# Patient Record
Sex: FEMALE | Race: WHITE | NOT HISPANIC OR LATINO | Employment: OTHER | URBAN - METROPOLITAN AREA
[De-identification: names, ages, dates, MRNs, and addresses within clinical notes are randomized per-mention and may not be internally consistent; named-entity substitution may affect disease eponyms.]

---

## 2023-02-22 LAB
APPEARANCE, URINE: ABNORMAL
BACTERIA, URINE: ABNORMAL /HPF
BILIRUBIN, URINE: NEGATIVE
BLOOD, URINE: NEGATIVE
COLOR, URINE: YELLOW
GLUCOSE, URINE: NEGATIVE MG/DL
KETONES, URINE: NEGATIVE MG/DL
LEUKOCYTE ESTERASE, URINE: ABNORMAL
NITRITE, URINE: NEGATIVE
PH, URINE: 8 (ref 5–8)
PROTEIN, URINE: NEGATIVE MG/DL
RBC, URINE: 2 /HPF (ref 0–5)
SPECIFIC GRAVITY, URINE: 1 (ref 1–1.03)
UROBILINOGEN, URINE: <2 MG/DL (ref 0–1.9)
WBC, URINE: 84 /HPF (ref 0–5)

## 2023-02-23 LAB — URINE CULTURE: NORMAL

## 2023-06-08 LAB — HELICOBACTER PYLORI AG: NORMAL

## 2023-06-12 LAB — HELICOBACTER PYLORI AG: NEGATIVE

## 2023-07-15 DIAGNOSIS — I10 ESSENTIAL (PRIMARY) HYPERTENSION: ICD-10-CM

## 2023-07-15 RX ORDER — LISINOPRIL 5 MG/1
TABLET ORAL
Qty: 90 TABLET | Refills: 0 | Status: SHIPPED | OUTPATIENT
Start: 2023-07-15 | End: 2023-10-15

## 2023-07-17 ENCOUNTER — OFFICE VISIT (OUTPATIENT)
Dept: PRIMARY CARE | Facility: CLINIC | Age: 73
End: 2023-07-17
Payer: MEDICARE

## 2023-07-17 VITALS
SYSTOLIC BLOOD PRESSURE: 106 MMHG | HEIGHT: 65 IN | OXYGEN SATURATION: 98 % | WEIGHT: 101.4 LBS | HEART RATE: 84 BPM | TEMPERATURE: 96.9 F | BODY MASS INDEX: 16.89 KG/M2 | DIASTOLIC BLOOD PRESSURE: 62 MMHG

## 2023-07-17 DIAGNOSIS — D80.3 IGG SUBCLASS DEFICIENCY (MULTI): ICD-10-CM

## 2023-07-17 DIAGNOSIS — Z12.31 ENCOUNTER FOR SCREENING MAMMOGRAM FOR BREAST CANCER: ICD-10-CM

## 2023-07-17 DIAGNOSIS — E46 PROTEIN-CALORIE MALNUTRITION, UNSPECIFIED SEVERITY (MULTI): ICD-10-CM

## 2023-07-17 DIAGNOSIS — Z00.00 ROUTINE GENERAL MEDICAL EXAMINATION AT HEALTH CARE FACILITY: Primary | ICD-10-CM

## 2023-07-17 DIAGNOSIS — Z13.6 SCREENING FOR CARDIOVASCULAR CONDITION: ICD-10-CM

## 2023-07-17 DIAGNOSIS — K21.9 GASTROESOPHAGEAL REFLUX DISEASE WITHOUT ESOPHAGITIS: ICD-10-CM

## 2023-07-17 PROBLEM — I10 BENIGN ESSENTIAL HYPERTENSION: Status: ACTIVE | Noted: 2023-07-17

## 2023-07-17 PROBLEM — J45.40 MODERATE PERSISTENT ASTHMA WITHOUT COMPLICATION (HHS-HCC): Status: ACTIVE | Noted: 2023-07-17

## 2023-07-17 PROBLEM — D75.1 POLYCYTHEMIA: Status: ACTIVE | Noted: 2023-07-17

## 2023-07-17 PROBLEM — G47.33 OSA (OBSTRUCTIVE SLEEP APNEA): Status: ACTIVE | Noted: 2023-07-17

## 2023-07-17 PROBLEM — G47.00 INSOMNIA: Status: ACTIVE | Noted: 2023-07-17

## 2023-07-17 PROCEDURE — 3074F SYST BP LT 130 MM HG: CPT | Performed by: FAMILY MEDICINE

## 2023-07-17 PROCEDURE — 1126F AMNT PAIN NOTED NONE PRSNT: CPT | Performed by: FAMILY MEDICINE

## 2023-07-17 PROCEDURE — 3008F BODY MASS INDEX DOCD: CPT | Performed by: FAMILY MEDICINE

## 2023-07-17 PROCEDURE — G0439 PPPS, SUBSEQ VISIT: HCPCS | Performed by: FAMILY MEDICINE

## 2023-07-17 PROCEDURE — 1160F RVW MEDS BY RX/DR IN RCRD: CPT | Performed by: FAMILY MEDICINE

## 2023-07-17 PROCEDURE — 3078F DIAST BP <80 MM HG: CPT | Performed by: FAMILY MEDICINE

## 2023-07-17 PROCEDURE — 1170F FXNL STATUS ASSESSED: CPT | Performed by: FAMILY MEDICINE

## 2023-07-17 PROCEDURE — 1036F TOBACCO NON-USER: CPT | Performed by: FAMILY MEDICINE

## 2023-07-17 PROCEDURE — 1159F MED LIST DOCD IN RCRD: CPT | Performed by: FAMILY MEDICINE

## 2023-07-17 RX ORDER — OMEPRAZOLE 20 MG/1
1 CAPSULE, DELAYED RELEASE ORAL DAILY
COMMUNITY
Start: 2023-07-12 | End: 2024-01-02 | Stop reason: WASHOUT

## 2023-07-17 RX ORDER — MV-MIN/FOLIC/VIT K/LYCOP/COQ10 200-100MCG
CAPSULE ORAL
COMMUNITY

## 2023-07-17 RX ORDER — FLUTICASONE PROPIONATE 50 MCG
SPRAY, SUSPENSION (ML) NASAL
COMMUNITY

## 2023-07-17 RX ORDER — FLUTICASONE FUROATE AND VILANTEROL TRIFENATATE 100; 25 UG/1; UG/1
POWDER RESPIRATORY (INHALATION)
COMMUNITY
Start: 2018-02-27 | End: 2023-08-26

## 2023-07-17 RX ORDER — ACETAMINOPHEN 500 MG
TABLET ORAL
COMMUNITY
End: 2024-01-02 | Stop reason: WASHOUT

## 2023-07-17 RX ORDER — FAMOTIDINE 40 MG/1
40 TABLET, FILM COATED ORAL NIGHTLY
Qty: 30 TABLET | Refills: 1 | Status: SHIPPED | OUTPATIENT
Start: 2023-07-17 | End: 2023-08-10

## 2023-07-17 ASSESSMENT — ACTIVITIES OF DAILY LIVING (ADL)
BATHING: INDEPENDENT
GROCERY_SHOPPING: INDEPENDENT
TAKING_MEDICATION: INDEPENDENT
MANAGING_FINANCES: INDEPENDENT
DOING_HOUSEWORK: INDEPENDENT
DRESSING: INDEPENDENT

## 2023-07-17 ASSESSMENT — PATIENT HEALTH QUESTIONNAIRE - PHQ9
2. FEELING DOWN, DEPRESSED OR HOPELESS: NOT AT ALL
SUM OF ALL RESPONSES TO PHQ9 QUESTIONS 1 AND 2: 0
1. LITTLE INTEREST OR PLEASURE IN DOING THINGS: NOT AT ALL

## 2023-07-17 ASSESSMENT — PAIN SCALES - GENERAL: PAINLEVEL: 0-NO PAIN

## 2023-07-17 NOTE — PATIENT INSTRUCTIONS
Immunizations recommended:  --Flu shot every fall.  --Pneumonia shots and covid vaccine have been done.  --Shingrix--has been done.  --Tetanus every 10 years.--yours was done in 2022.     Pap not needed.     Colonoscopy should be done every 10 years after the age of 45, unless there was a previous abnormality, or family history of colon cancer.  Yours was done in 2019.     Mammogram screening recommendations are changing, but at this time, I recommend a mammogram every 1-2 years in your 40's, and yearly after the age of 50. Having a family history of breast cancer may change that.  Yours done 7/21/21, so due soon.     You should try to get 150 minutes of exercise weekly.  Be sure to eat a diet rich in fruits and vegetables, and low in saturated fats and sodium.  Strive for a healthy BMI of less than 25.      Make sure to wear sun screen when outside, and check for changing or unusual moles.     Pre-menopause recommendations are for 1000 mg calcium and 1000 units vitamin D3 daily. After menopause calcium recommendation is 1200 mg, with 1000 units of vitamin D3  Bone density is due to evaluate for osteoporosis.     I recommend you get a Living Will and Durable Power of  for Healthcare. These documents state what care you would want if you couldn't speak for yourself. They help your loved ones in caring for you if that happens.   Once you have those forms completed, you can keep a copy on file with your doctor.     Specialists being seen:  Dr. Tracy, sleep medicine  Dr. Ladd, allergist.  yessenia Goodwin had seen.    Dermatologist as needed.     BP controlled on 5 mg lisinopril.  Check fasting lipids some morning.  Other blood work done in May.  Blood Pressure Treatment goals include:        BP of 120/80, with no higher than 140/85 on consistent basis.    Exercise at 150 minutes weekly.  Eat a balanced diet, rich in fruits and vegetables, low in sodium and processed foods.  If you are overweight, work toward  a goal BMI of less than 25.     For asthma, continue Breo.    For  acid reflux, taking omeprazole for 2 weeks.  (Or can alternate omeprazole with famotidine when 3 pills  left.  When done, switch to famotidine 40 mg at bedtime.  If doing well, will wean to lower dose and stop

## 2023-07-17 NOTE — PROGRESS NOTES
"Subjective   Reason for Visit: Ev Loja is an 73 y.o. female here for a Medicare Wellness visit.     Past Medical, Surgical, and Family History reviewed and updated in chart.    Reviewed all medications by prescribing practitioner or clinical pharmacist (such as prescriptions, OTCs, herbal therapies and supplements) and documented in the medical record.    Saw Dr. Tracy last week.  Having trouble sleeping  Thinks may be related to GERD.  Put on omeprazole for 2 weeks  Is doing  better.    Breathing doing well with Breo.        Patient Care Team:  Sasha Ferguson MD as PCP - General  Sasha Ferguson MD as PCP [facility] Attributed Provider     Review of Systems    Objective   Vitals:  /62 (BP Location: Right arm, Patient Position: Sitting, BP Cuff Size: Small child)   Pulse 84   Temp 36.1 °C (96.9 °F) (Temporal)   Ht 1.651 m (5' 5\")   Wt 46 kg (101 lb 6.4 oz)   SpO2 98%   BMI 16.87 kg/m²       Physical Exam  Vitals reviewed.   Constitutional:       Appearance: Normal appearance.   Cardiovascular:      Rate and Rhythm: Normal rate and regular rhythm.      Heart sounds: No murmur heard.  Pulmonary:      Effort: Pulmonary effort is normal.      Breath sounds: Normal breath sounds.   Musculoskeletal:      Right lower leg: No edema.      Left lower leg: No edema.   Neurological:      Mental Status: She is alert.         Assessment/Plan   Problem List Items Addressed This Visit       IgG subclass deficiency (CMS/HCC)    Protein-calorie malnutrition, unspecified severity (CMS/HCC)    GERD (gastroesophageal reflux disease)    Relevant Medications    famotidine (Pepcid) 40 mg tablet     Other Visit Diagnoses       Routine general medical examination at health care facility    -  Primary    Relevant Orders    1 Year Follow Up In [facility] - PCP - Wellness Exam    BMI less than 19,adult        Screening for cardiovascular condition        Relevant Orders    Lipid panel    CT cardiac scoring wo IV " contrast    Encounter for screening mammogram for breast cancer        Relevant Orders    BI mammo bilateral screening tomosynthesis

## 2023-08-10 DIAGNOSIS — K21.9 GASTROESOPHAGEAL REFLUX DISEASE WITHOUT ESOPHAGITIS: ICD-10-CM

## 2023-08-10 RX ORDER — FAMOTIDINE 40 MG/1
40 TABLET, FILM COATED ORAL NIGHTLY
Qty: 30 TABLET | Refills: 1 | Status: SHIPPED | OUTPATIENT
Start: 2023-08-10 | End: 2023-08-27

## 2023-08-26 DIAGNOSIS — J45.40 MODERATE PERSISTENT ASTHMA WITHOUT COMPLICATION (HHS-HCC): Primary | ICD-10-CM

## 2023-08-26 RX ORDER — FLUTICASONE FUROATE AND VILANTEROL TRIFENATATE 100; 25 UG/1; UG/1
POWDER RESPIRATORY (INHALATION)
Qty: 180 EACH | Refills: 1 | Status: SHIPPED | OUTPATIENT
Start: 2023-08-26 | End: 2024-02-20

## 2023-09-07 PROBLEM — J32.2 CHRONIC POSTERIOR ETHMOIDAL SINUSITIS: Status: ACTIVE | Noted: 2023-09-07

## 2023-09-07 PROBLEM — M25.511 RIGHT SHOULDER PAIN: Status: ACTIVE | Noted: 2023-09-07

## 2023-09-07 PROBLEM — D22.4 MELANOCYTIC NEVI OF SCALP AND NECK: Status: ACTIVE | Noted: 2022-11-18

## 2023-09-07 PROBLEM — Z85.828 PERSONAL HISTORY OF OTHER MALIGNANT NEOPLASM OF SKIN: Status: ACTIVE | Noted: 2022-11-18

## 2023-09-07 PROBLEM — C44.319 BASAL CELL CARCINOMA OF SKIN OF OTHER PARTS OF FACE: Status: ACTIVE | Noted: 2022-11-18

## 2023-09-07 PROBLEM — L82.1 OTHER SEBORRHEIC KERATOSIS: Status: ACTIVE | Noted: 2022-11-18

## 2023-09-07 PROBLEM — L30.9 DERMATITIS, UNSPECIFIED: Status: ACTIVE | Noted: 2022-11-18

## 2023-09-07 PROBLEM — R35.0 INCREASED URINARY FREQUENCY: Status: ACTIVE | Noted: 2023-09-07

## 2023-09-07 PROBLEM — D48.5 NEOPLASM OF UNCERTAIN BEHAVIOR OF SKIN: Status: ACTIVE | Noted: 2022-11-18

## 2023-09-07 PROBLEM — D18.01 HEMANGIOMA OF SKIN AND SUBCUTANEOUS TISSUE: Status: ACTIVE | Noted: 2022-11-18

## 2023-09-07 PROBLEM — R39.9 UTI SYMPTOMS: Status: ACTIVE | Noted: 2023-09-07

## 2023-09-07 PROBLEM — L85.3 XEROSIS CUTIS: Status: ACTIVE | Noted: 2022-11-18

## 2023-09-07 PROBLEM — R73.09 ELEVATED GLUCOSE: Status: ACTIVE | Noted: 2023-09-07

## 2023-09-07 PROBLEM — N39.0 URINARY TRACT INFECTION, ACUTE: Status: ACTIVE | Noted: 2023-09-07

## 2023-09-07 RX ORDER — POLYETHYLENE GLYCOL 3350 17 G/17G
255 POWDER, FOR SOLUTION ORAL
COMMUNITY
Start: 2016-11-22 | End: 2024-01-02 | Stop reason: WASHOUT

## 2023-09-07 RX ORDER — ONDANSETRON 4 MG/1
4 TABLET, ORALLY DISINTEGRATING ORAL EVERY 4 HOURS PRN
COMMUNITY
Start: 2016-11-21 | End: 2024-01-02 | Stop reason: WASHOUT

## 2023-09-07 RX ORDER — TRIAMCINOLONE ACETONIDE 0.25 MG/G
CREAM TOPICAL
COMMUNITY
Start: 2022-11-18 | End: 2024-01-02 | Stop reason: WASHOUT

## 2023-09-07 RX ORDER — AMOXICILLIN 500 MG/1
CAPSULE ORAL
COMMUNITY
Start: 2023-02-24 | End: 2024-01-02 | Stop reason: WASHOUT

## 2023-10-06 ENCOUNTER — LAB (OUTPATIENT)
Dept: LAB | Facility: LAB | Age: 73
End: 2023-10-06
Payer: MEDICARE

## 2023-10-06 DIAGNOSIS — Z13.6 SCREENING FOR CARDIOVASCULAR CONDITION: ICD-10-CM

## 2023-10-06 LAB
CHOLEST SERPL-MCNC: 153 MG/DL (ref 0–199)
CHOLESTEROL/HDL RATIO: 2
HDLC SERPL-MCNC: 76.1 MG/DL
LDLC SERPL CALC-MCNC: 62 MG/DL (ref 140–190)
NON HDL CHOLESTEROL: 77 MG/DL (ref 0–149)
TRIGL SERPL-MCNC: 73 MG/DL (ref 0–149)
VLDL: 15 MG/DL (ref 0–40)

## 2023-10-06 PROCEDURE — 36415 COLL VENOUS BLD VENIPUNCTURE: CPT

## 2023-10-06 PROCEDURE — 80061 LIPID PANEL: CPT

## 2023-10-11 ENCOUNTER — APPOINTMENT (OUTPATIENT)
Dept: SLEEP MEDICINE | Facility: CLINIC | Age: 73
End: 2023-10-11
Payer: MEDICARE

## 2023-10-14 DIAGNOSIS — I10 ESSENTIAL (PRIMARY) HYPERTENSION: ICD-10-CM

## 2023-10-15 RX ORDER — LISINOPRIL 5 MG/1
TABLET ORAL
Qty: 90 TABLET | Refills: 0 | Status: SHIPPED | OUTPATIENT
Start: 2023-10-15 | End: 2024-01-01

## 2023-10-21 ENCOUNTER — OFFICE VISIT (OUTPATIENT)
Dept: DERMATOLOGY | Facility: CLINIC | Age: 73
End: 2023-10-21
Payer: MEDICARE

## 2023-10-21 DIAGNOSIS — D48.5 NEOPLASM OF UNCERTAIN BEHAVIOR OF SKIN: Primary | ICD-10-CM

## 2023-10-21 PROCEDURE — 1036F TOBACCO NON-USER: CPT | Performed by: NURSE PRACTITIONER

## 2023-10-21 PROCEDURE — 88305 TISSUE EXAM BY PATHOLOGIST: CPT | Mod: TC,DER | Performed by: NURSE PRACTITIONER

## 2023-10-21 PROCEDURE — 99212 OFFICE O/P EST SF 10 MIN: CPT | Performed by: NURSE PRACTITIONER

## 2023-10-21 PROCEDURE — 88305 TISSUE EXAM BY PATHOLOGIST: CPT | Performed by: DERMATOLOGY

## 2023-10-21 PROCEDURE — 1126F AMNT PAIN NOTED NONE PRSNT: CPT | Performed by: NURSE PRACTITIONER

## 2023-10-21 PROCEDURE — 1159F MED LIST DOCD IN RCRD: CPT | Performed by: NURSE PRACTITIONER

## 2023-10-21 PROCEDURE — 1160F RVW MEDS BY RX/DR IN RCRD: CPT | Performed by: NURSE PRACTITIONER

## 2023-10-21 PROCEDURE — 11102 TANGNTL BX SKIN SINGLE LES: CPT | Performed by: NURSE PRACTITIONER

## 2023-10-21 PROCEDURE — 3008F BODY MASS INDEX DOCD: CPT | Performed by: NURSE PRACTITIONER

## 2023-10-23 NOTE — PROGRESS NOTES
Subjective     Ev Loja is a 73 y.o. female who presents for the following: Skin Check (Presents for examination of single lesion to lower leg, present for an unknown amount of time and previously painful (now asymptomatic). PMHX significant for BCC. No further complaints.).     Review of Systems:  No other skin or systemic complaints other than what is documented elsewhere in the note.    The following portions of the chart were reviewed this encounter and updated as appropriate:          Skin Cancer History  No skin cancer on file.      Specialty Problems          Dermatology Problems    Basal cell carcinoma of skin of other parts of face    Dermatitis, unspecified    Hemangioma of skin and subcutaneous tissue    Melanocytic nevi of scalp and neck    Neoplasm of uncertain behavior of skin    Other seborrheic keratosis    Personal history of other malignant neoplasm of skin    Xerosis cutis        Objective   Well appearing patient in no apparent distress; mood and affect are within normal limits.    A focused skin examination was performed. All findings within normal limits unless otherwise noted below.    Assessment/Plan   1. Neoplasm of uncertain behavior of skin  Right Lower Leg - Anterior  1.0 cm round scaly papule              Lesion biopsy  Type of biopsy: tangential    Informed consent: discussed and consent obtained    Timeout: patient name, date of birth, surgical site, and procedure verified    Procedure prep:  Patient was prepped and draped  Anesthesia: the lesion was anesthetized in a standard fashion    Anesthetic:  1% lidocaine w/ epinephrine 1-100,000 local infiltration  Instrument used: DermaBlade    Hemostasis achieved with: aluminum chloride    Outcome: patient tolerated procedure well    Post-procedure details: sterile dressing applied and wound care instructions given    Dressing type: petrolatum and bandage      Specimen 1 - Dermatopathology- DERM LAB  Differential Diagnosis: nmsc  Check  Margins Yes/No?:    Comments:    Dermpath Lab: Routine Histopathology (formalin-fixed tissue)    - Discussed differential with patient in clinic today.   - Given uncertainty in clinical diagnosis, biopsy is recommended in clinic today.  - The patient expressed understanding, is in agreement with this plan, and wishes to proceed with biopsy.  - Oral and written wound care instructions provided.  - Advised patient that the office will call within 2 weeks to discuss biopsy results.

## 2023-10-25 ENCOUNTER — APPOINTMENT (OUTPATIENT)
Dept: RADIOLOGY | Facility: CLINIC | Age: 73
End: 2023-10-25
Payer: MEDICARE

## 2023-10-25 LAB
LABORATORY COMMENT REPORT: NORMAL
PATH REPORT.FINAL DX SPEC: NORMAL
PATH REPORT.GROSS SPEC: NORMAL
PATH REPORT.RELEVANT HX SPEC: NORMAL
PATH REPORT.TOTAL CANCER: NORMAL

## 2023-12-30 ASSESSMENT — DERMATOLOGY QUALITY OF LIFE (QOL) ASSESSMENT
RATE HOW BOTHERED YOU ARE BY EFFECTS OF YOUR SKIN PROBLEMS ON YOUR ACTIVITIES (EG, GOING OUT, ACCOMPLISHING WHAT YOU WANT, WORK ACTIVITIES OR YOUR RELATIONSHIPS WITH OTHERS): 0 - NEVER BOTHERED
RATE HOW BOTHERED YOU ARE BY EFFECTS OF YOUR SKIN PROBLEMS ON YOUR ACTIVITIES (EG, GOING OUT, ACCOMPLISHING WHAT YOU WANT, WORK ACTIVITIES OR YOUR RELATIONSHIPS WITH OTHERS): 0 - NEVER BOTHERED
WHAT SINGLE SKIN CONDITION LISTED BELOW IS THE PATIENT ANSWERING THE QUALITY-OF-LIFE ASSESSMENT QUESTIONS ABOUT: NONE OF THE ABOVE
RATE HOW EMOTIONALLY BOTHERED YOU ARE BY YOUR SKIN PROBLEM (FOR EXAMPLE, WORRY, EMBARRASSMENT, FRUSTRATION): 0 - NEVER BOTHERED
RATE HOW BOTHERED YOU ARE BY SYMPTOMS OF YOUR SKIN PROBLEM (EG, ITCHING, STINGING BURNING, HURTING OR SKIN IRRITATION): 6 - ALWAYS BOTHERED
WHAT SINGLE SKIN CONDITION LISTED BELOW IS THE PATIENT ANSWERING THE QUALITY-OF-LIFE ASSESSMENT QUESTIONS ABOUT: NONE OF THE ABOVE
RATE HOW BOTHERED YOU ARE BY SYMPTOMS OF YOUR SKIN PROBLEM (EG, ITCHING, STINGING BURNING, HURTING OR SKIN IRRITATION): 6 - ALWAYS BOTHERED
RATE HOW EMOTIONALLY BOTHERED YOU ARE BY YOUR SKIN PROBLEM (FOR EXAMPLE, WORRY, EMBARRASSMENT, FRUSTRATION): 0 - NEVER BOTHERED

## 2023-12-30 ASSESSMENT — PATIENT GLOBAL ASSESSMENT (PGA): WHAT IS THE PGA: PATIENT GLOBAL ASSESSMENT:  3 - MODERATE

## 2024-01-02 ENCOUNTER — OFFICE VISIT (OUTPATIENT)
Dept: DERMATOLOGY | Facility: CLINIC | Age: 74
End: 2024-01-02
Payer: MEDICARE

## 2024-01-02 DIAGNOSIS — L82.1 SEBORRHEIC KERATOSIS: ICD-10-CM

## 2024-01-02 DIAGNOSIS — D22.9 MULTIPLE BENIGN NEVI: Primary | ICD-10-CM

## 2024-01-02 DIAGNOSIS — Z85.828 PERSONAL HISTORY OF SKIN CANCER: ICD-10-CM

## 2024-01-02 DIAGNOSIS — L90.5 SCAR CONDITIONS AND FIBROSIS OF SKIN: ICD-10-CM

## 2024-01-02 DIAGNOSIS — L81.4 LENTIGO: ICD-10-CM

## 2024-01-02 DIAGNOSIS — B35.3 TINEA PEDIS OF BOTH FEET: ICD-10-CM

## 2024-01-02 DIAGNOSIS — R20.2 NOTALGIA PARESTHETICA: ICD-10-CM

## 2024-01-02 PROBLEM — L85.8 KERATOACANTHOMA: Status: ACTIVE | Noted: 2024-01-02

## 2024-01-02 PROBLEM — D18.01 HEMANGIOMA OF SKIN AND SUBCUTANEOUS TISSUE: Status: RESOLVED | Noted: 2022-11-18 | Resolved: 2024-01-02

## 2024-01-02 PROBLEM — L85.3 XEROSIS CUTIS: Status: RESOLVED | Noted: 2022-11-18 | Resolved: 2024-01-02

## 2024-01-02 PROBLEM — D22.4 MELANOCYTIC NEVI OF SCALP AND NECK: Status: RESOLVED | Noted: 2022-11-18 | Resolved: 2024-01-02

## 2024-01-02 PROBLEM — L57.0 ACTINIC KERATOSIS: Status: ACTIVE | Noted: 2024-01-02

## 2024-01-02 PROBLEM — D48.5 NEOPLASM OF UNCERTAIN BEHAVIOR OF SKIN: Status: RESOLVED | Noted: 2022-11-18 | Resolved: 2024-01-02

## 2024-01-02 PROCEDURE — 1159F MED LIST DOCD IN RCRD: CPT | Performed by: DERMATOLOGY

## 2024-01-02 PROCEDURE — 1126F AMNT PAIN NOTED NONE PRSNT: CPT | Performed by: DERMATOLOGY

## 2024-01-02 PROCEDURE — 99213 OFFICE O/P EST LOW 20 MIN: CPT | Performed by: DERMATOLOGY

## 2024-01-02 PROCEDURE — 1036F TOBACCO NON-USER: CPT | Performed by: DERMATOLOGY

## 2024-01-02 PROCEDURE — 1160F RVW MEDS BY RX/DR IN RCRD: CPT | Performed by: DERMATOLOGY

## 2024-01-02 PROCEDURE — 3008F BODY MASS INDEX DOCD: CPT | Performed by: DERMATOLOGY

## 2024-01-02 ASSESSMENT — DERMATOLOGY PATIENT ASSESSMENT
DO YOU HAVE ANY NEW OR CHANGING LESIONS: NO
HAVE YOU HAD OR DO YOU HAVE A STAPH INFECTION: NO
ARE YOU ON BIRTH CONTROL: NO
DO YOU USE SUNSCREEN: OCCASIONALLY
DO YOU USE A TANNING BED: NO
DO YOU HAVE IRREGULAR MENSTRUAL CYCLES: NO
HAVE YOU HAD OR DO YOU HAVE VASCULAR DISEASE: NO
ARE YOU AN ORGAN TRANSPLANT RECIPIENT: NO
ARE YOU TRYING TO GET PREGNANT: NO

## 2024-01-02 ASSESSMENT — DERMATOLOGY QUALITY OF LIFE (QOL) ASSESSMENT
RATE HOW BOTHERED YOU ARE BY SYMPTOMS OF YOUR SKIN PROBLEM (EG, ITCHING, STINGING BURNING, HURTING OR SKIN IRRITATION): 0 - NEVER BOTHERED
ARE THERE EXCLUSIONS OR EXCEPTIONS FOR THE QUALITY OF LIFE ASSESSMENT: NO
RATE HOW BOTHERED YOU ARE BY EFFECTS OF YOUR SKIN PROBLEMS ON YOUR ACTIVITIES (EG, GOING OUT, ACCOMPLISHING WHAT YOU WANT, WORK ACTIVITIES OR YOUR RELATIONSHIPS WITH OTHERS): 0 - NEVER BOTHERED
RATE HOW EMOTIONALLY BOTHERED YOU ARE BY YOUR SKIN PROBLEM (FOR EXAMPLE, WORRY, EMBARRASSMENT, FRUSTRATION): 0 - NEVER BOTHERED
DATE THE QUALITY-OF-LIFE ASSESSMENT WAS COMPLETED: 66841

## 2024-01-02 ASSESSMENT — PATIENT GLOBAL ASSESSMENT (PGA): PATIENT GLOBAL ASSESSMENT: PATIENT GLOBAL ASSESSMENT:  1 - CLEAR

## 2024-01-02 ASSESSMENT — ITCH NUMERIC RATING SCALE: HOW SEVERE IS YOUR ITCHING?: 0

## 2024-01-02 NOTE — PROGRESS NOTES
Subjective     Ev Loja is a 73 y.o. female who presents for the following: Skin Check.       She was last seen in October by Sue Mayne, a skin biopsy was performed on the right shin that was suggestive of a resolving keratoacanthoma. She was recommended Mohs surgery       Review of Systems:  No other skin or systemic complaints other than what is documented elsewhere in the note.    The following portions of the chart were reviewed this encounter and updated as appropriate:  Tobacco  Allergies  Meds  Problems  Med Hx  Surg Hx         Skin Cancer History  No skin cancer on file.      Specialty Problems          Dermatology Problems    Basal cell carcinoma of skin of other parts of face     history of BCC on right temporal region diagnosed by Dr. Maria Del Rosario Heath on 5/31/19 s/p Mohssurgery by Dr. Villa on 8/8/19          Dermatitis, unspecified    Actinic keratosis    Keratoacanthoma     - biopsied 10/2023, suggestive of a resolving keratoacanthoma. She was recommended Mohs surgery but declines in favor of monitoring for recurrence given her age and location on leg with higher risk of poor wound healing  - 1/2024, scar on exam, no evidence of recurrence             Objective   Well appearing patient in no apparent distress; mood and affect are within normal limits.    A full examination was performed including scalp, head, eyes, ears, nose, lips, neck, chest, axillae, abdomen, back, buttocks, bilateral upper extremities, bilateral lower extremities, hands, feet, fingers, toes, fingernails, and toenails. All findings within normal limits unless otherwise noted below.    Assessment/Plan   1. Multiple benign nevi  Brown and tan macules and papules with reassuring findings on dermoscopy    -These lesions have benign, reassuring patterns on dermoscopy  -Recommend continued self observation, and to contact the office if any changes in nevi are noticed    2. Lentigo  Tan macules    -Benign appearing on  exam  -Reassurance, recommend observation    3. Seborrheic keratosis  Stuck on, waxy macule(s)/papule(s)/plaque(s) with comedo-like openings and milia like cysts    -Discussed the nature of the diagnosis  -Reassurance, recommend continued observation    4. Personal history of skin cancer    Personal History of Non-Melanoma Skin Cancer  -Well healed scar(s) with no evidence of recurrence  -Discussed the need for annual or semi-annual skin examinations and to return sooner if any new or changing lesions are noticed. Patient verbalizes understanding    Related Procedures  Follow Up In Dermatology - Established Patient    5. Scar conditions and fibrosis of skin  Right Lower Leg - Anterior  Atrophic white patch at site of biopsy site    She was last seen in October by Sue Mayne, a skin biopsy was performed on the right shin that was suggestive of a resolving keratoacanthoma. She was recommended Mohs surgery but has not scheduled it. It has healed well    We discussed the nature of keratoacanthoma - that it can resolve on it's own, but it can also recur locally. Based on her age, location on the leg and risk for poor wound-healing, she prefers to monitor clinically. She understands to call the office ASAP if the skin in this area gets rough, raised, or crusted again or if another similar lesion appears        6. Tinea pedis of both feet (2)  Left Foot - Anterior, Left Foot - Posterior  Erythema and scaling in a moccasin-distribution with interdigital web space maceration    - plans to use OTC products    7. Notalgia paresthetica  Left Upper Back  Brown patch    Long-standing problem  Has tried topicals with no improvement  Neck stretches in the morning seem to help        Full Skin Exam 6 months

## 2024-01-10 ENCOUNTER — OFFICE VISIT (OUTPATIENT)
Dept: SLEEP MEDICINE | Facility: CLINIC | Age: 74
End: 2024-01-10
Payer: MEDICARE

## 2024-01-10 VITALS
HEIGHT: 65 IN | OXYGEN SATURATION: 97 % | DIASTOLIC BLOOD PRESSURE: 75 MMHG | WEIGHT: 102 LBS | HEART RATE: 80 BPM | SYSTOLIC BLOOD PRESSURE: 118 MMHG | BODY MASS INDEX: 17 KG/M2

## 2024-01-10 DIAGNOSIS — K21.9 GASTROESOPHAGEAL REFLUX DISEASE WITHOUT ESOPHAGITIS: ICD-10-CM

## 2024-01-10 DIAGNOSIS — G47.00 INSOMNIA, UNSPECIFIED TYPE: ICD-10-CM

## 2024-01-10 DIAGNOSIS — G47.33 OSA (OBSTRUCTIVE SLEEP APNEA): Primary | ICD-10-CM

## 2024-01-10 PROCEDURE — 1036F TOBACCO NON-USER: CPT | Performed by: STUDENT IN AN ORGANIZED HEALTH CARE EDUCATION/TRAINING PROGRAM

## 2024-01-10 PROCEDURE — 1159F MED LIST DOCD IN RCRD: CPT | Performed by: STUDENT IN AN ORGANIZED HEALTH CARE EDUCATION/TRAINING PROGRAM

## 2024-01-10 PROCEDURE — 3008F BODY MASS INDEX DOCD: CPT | Performed by: STUDENT IN AN ORGANIZED HEALTH CARE EDUCATION/TRAINING PROGRAM

## 2024-01-10 PROCEDURE — 99214 OFFICE O/P EST MOD 30 MIN: CPT | Performed by: STUDENT IN AN ORGANIZED HEALTH CARE EDUCATION/TRAINING PROGRAM

## 2024-01-10 PROCEDURE — 1160F RVW MEDS BY RX/DR IN RCRD: CPT | Performed by: STUDENT IN AN ORGANIZED HEALTH CARE EDUCATION/TRAINING PROGRAM

## 2024-01-10 PROCEDURE — 1126F AMNT PAIN NOTED NONE PRSNT: CPT | Performed by: STUDENT IN AN ORGANIZED HEALTH CARE EDUCATION/TRAINING PROGRAM

## 2024-01-10 PROCEDURE — 3074F SYST BP LT 130 MM HG: CPT | Performed by: STUDENT IN AN ORGANIZED HEALTH CARE EDUCATION/TRAINING PROGRAM

## 2024-01-10 PROCEDURE — 3078F DIAST BP <80 MM HG: CPT | Performed by: STUDENT IN AN ORGANIZED HEALTH CARE EDUCATION/TRAINING PROGRAM

## 2024-01-10 ASSESSMENT — SLEEP AND FATIGUE QUESTIONNAIRES
HOW LIKELY ARE YOU TO NOD OFF OR FALL ASLEEP WHILE LYING DOWN TO REST IN THE AFTERNOON WHEN CIRCUMSTANCES PERMIT: WOULD NEVER DOZE
HOW LIKELY ARE YOU TO NOD OFF OR FALL ASLEEP WHILE SITTING AND READING: WOULD NEVER DOZE
SATISFACTION_WITH_CURRENT_SLEEP_PATTERN: SATISFIED
WORRIED_DISTRESSED_DUE_TO_SLEEP: SOMEWHAT
SITING INACTIVE IN A PUBLIC PLACE LIKE A CLASS ROOM OR A MOVIE THEATER: WOULD NEVER DOZE
HOW LIKELY ARE YOU TO NOD OFF OR FALL ASLEEP WHILE SITTING QUIETLY AFTER LUNCH WITHOUT ALCOHOL: WOULD NEVER DOZE
HOW LIKELY ARE YOU TO NOD OFF OR FALL ASLEEP IN A CAR, WHILE STOPPED FOR A FEW MINUTES IN TRAFFIC: WOULD NEVER DOZE
SLEEP_PROBLEM_NOTICEABLE_TO_OTHERS: MUCH
HOW LIKELY ARE YOU TO NOD OFF OR FALL ASLEEP WHILE WATCHING TV: WOULD NEVER DOZE
SLEEP_PROBLEM_INTERFERES_DAILY_ACTIVITIES: A LITTLE
HOW LIKELY ARE YOU TO NOD OFF OR FALL ASLEEP WHEN YOU ARE A PASSENGER IN A CAR FOR AN HOUR WITHOUT A BREAK: WOULD NEVER DOZE
HOW LIKELY ARE YOU TO NOD OFF OR FALL ASLEEP WHILE SITTING AND TALKING TO SOMEONE: WOULD NEVER DOZE
ESS-CHAD TOTAL SCORE: 0

## 2024-01-10 ASSESSMENT — LIFESTYLE VARIABLES
HOW OFTEN DO YOU HAVE A DRINK CONTAINING ALCOHOL: NEVER
SKIP TO QUESTIONS 9-10: 1
HOW MANY STANDARD DRINKS CONTAINING ALCOHOL DO YOU HAVE ON A TYPICAL DAY: PATIENT DOES NOT DRINK
HOW OFTEN DO YOU HAVE SIX OR MORE DRINKS ON ONE OCCASION: NEVER
AUDIT-C TOTAL SCORE: 0

## 2024-01-10 ASSESSMENT — PATIENT HEALTH QUESTIONNAIRE - PHQ9
SUM OF ALL RESPONSES TO PHQ9 QUESTIONS 1 AND 2: 0
1. LITTLE INTEREST OR PLEASURE IN DOING THINGS: NOT AT ALL
2. FEELING DOWN, DEPRESSED OR HOPELESS: NOT AT ALL

## 2024-01-10 ASSESSMENT — ENCOUNTER SYMPTOMS
CARDIOVASCULAR NEGATIVE: 1
CONSTITUTIONAL NEGATIVE: 1
NEUROLOGICAL NEGATIVE: 1
RESPIRATORY NEGATIVE: 1
PSYCHIATRIC NEGATIVE: 1

## 2024-01-10 NOTE — PROGRESS NOTES
Patient: Ev Loja    [id number]  : 1950 -- AGE 73 y.o.    Provider: Jean Tracy MD     Location [facility]   Service Date: 1/10/2024              [facility]  Followup Visit Note    HISTORY OF PRESENT ILLNESS     HISTORY OF PRESENT ILLNESS   Ev Loja is a 73 y.o. female with h/o Hypertension, RENE, and Insomnia who presents to a [facility] for followup.     Assessment and plan from last visit:     # RENE  - she was first diagnosed in 2018. start on CPAP 10 -> 12 cwp.  - it was done with Dr. Lopes - [facility] lab  - doing very well with her CPAP therapy   - compliance is 100% with good control of symptoms  - will adjust pressure setting to 10 cwp and change back to nasal mask  - renew supply order     # HTN  - /74 today in clinic  - well controlled, continue with healthy diet & exercise  - continue to f/u with PCP      # Fatigue  - feeling better about this     # Sleep Maintenance Insomnia/GERD  - still finds herself waking up in the middle of the night and sometimes it's related to her digestive issues  - she was treated for candidiasis but still has some reflux issues  - she was also switched to 12 cwp during her last titration and was forced to use a FFM instead of nasal. She feels like sometimes she also feels more bloated.  - will adjust CPAP setting  - start omeprazole 20 mg for 2 weeks to see if she feels any better, if she does, we may consider switching to a H2 Blocker for maintenance therapy for her GERD.    Current History    RENE:   -not having issues with CPAP    GERD/Sleep maintenance Insomnia:   -will sleep 2-3 days well and then have a night where she feels like she cannot stay asleep. She sits in bed and just tries to fall back asleep.   -is now taking psyllium and Pepcid, feels the GERD sx have resolved but didn't fix insomnia   -on a normal day sleeps 7-8  -thinks could be related to ruminating on  "thoughts at night   -does not feel like she needs a nap        ESS: 0  BOO: 7  FOSQ: 37    REVIEW OF SYSTEMS     REVIEW OF SYSTEMS  Review of Systems   Constitutional: Negative.    HENT: Negative.     Respiratory: Negative.     Cardiovascular: Negative.    Genitourinary: Negative.    Skin: Negative.    Neurological: Negative.    Psychiatric/Behavioral: Negative.           ALLERGIES AND MEDICATIONS     ALLERGIES  No Known Allergies    MEDICATIONS: She has a current medication list which includes the following prescription(s): bone essentials - Take by mouth, famotidine - TAKE 1 TABLET (40 MG) BY MOUTH ONCE DAILY AT BEDTIME, fluticasone - Administer into affected nostril(s), breo ellipta - 1 PUFF INTO THE LUNGS ONCE DAILY, and lisinopril - TAKE 1 TABLET BY MOUTH EVERY DAY.    PAST MEDICAL HISTORY : She  has a past medical history of Basal cell carcinoma, Personal history of other diseases of the circulatory system (02/27/2018), and Personal history of other diseases of the respiratory system (02/27/2018).    PAST SURGICAL HISTORY: She  has a past surgical history that includes Sinus surgery (02/27/2018) and Other surgical history (07/13/2021).     FAMILY HISTORY: No changes since previous visit. Otherwise non-contributory as charted.     SOCIAL HISTORY  She  reports that she has never smoked. She has never been exposed to tobacco smoke. She has never used smokeless tobacco. She reports that she does not currently use alcohol. She reports that she does not use drugs.       PHYSICAL EXAM     VITAL SIGNS: /75   Pulse 80   Ht 1.638 m (5' 4.5\")   Wt 46.3 kg (102 lb)   SpO2 97%   BMI 17.24 kg/m²      PREVIOUS WEIGHTS:  Wt Readings from Last 3 Encounters:   01/10/24 46.3 kg (102 lb)   07/17/23 46 kg (101 lb 6.4 oz)   07/12/23 45.1 kg (99 lb 6 oz)         RESULTS/DATA     Bicarbonate (mmol/L)   Date Value   07/26/2022 25   07/13/2021 28       PAP Adherence  A PAP adherence download was obtained and data was " reviewed personally today in clinic.    ASSESSMENT/PLAN     Ms. Loja is a 73 y.o. female and she returns in followup to the [facility] for RENE and Insomnia.    Problem List, Orders, Assessment, Recommendations:    #RENE  -initially diagnosed in 2018 and has been very well controlled   -continues to have perfect adherence with CPAP, AHI .2  -continue with CPAP 10cwp   -renew supply orders    #HTN  -BP today 118/75  -well controlled on lisinopril   -will continue to follow with PCP    #Sleep maintenance insomnia/GERD  -felt in the past that she was waking up in the middle of the night due to digestive issues   -now on famotidine and psyllium which is controlling GERD sx but insomnia sx persist   -today we discussed 1) getting out of bed when she cannot sleep/doing something else and 2) paradoxical intention    Disposition    Return to clinic in 12 months    Caroline Lozano MS4         I was present with the Medical Student who participated in the documentation of this note. I have personally seen and re-examined the patient and performed the medical decision-making components (assessment and plan of care). I have reviewed the Medical Student documentation and verified the findings in the note as written with additions or exceptions as stated in the body of this note.

## 2024-02-01 ENCOUNTER — HOSPITAL ENCOUNTER (OUTPATIENT)
Dept: RADIOLOGY | Facility: HOSPITAL | Age: 74
Discharge: HOME | End: 2024-02-01
Payer: MEDICARE

## 2024-02-01 DIAGNOSIS — Z13.6 ENCOUNTER FOR SCREENING FOR CARDIOVASCULAR DISORDERS: ICD-10-CM

## 2024-02-01 PROCEDURE — 75571 CT HRT W/O DYE W/CA TEST: CPT

## 2024-02-05 DIAGNOSIS — H91.90 HEARING LOSS, UNSPECIFIED HEARING LOSS TYPE, UNSPECIFIED LATERALITY: Primary | ICD-10-CM

## 2024-04-04 ENCOUNTER — HOSPITAL ENCOUNTER (OUTPATIENT)
Dept: RADIOLOGY | Facility: HOSPITAL | Age: 74
Discharge: HOME | End: 2024-04-04
Payer: MEDICARE

## 2024-04-04 ENCOUNTER — OFFICE VISIT (OUTPATIENT)
Dept: ORTHOPEDIC SURGERY | Facility: HOSPITAL | Age: 74
End: 2024-04-04
Payer: MEDICARE

## 2024-04-04 DIAGNOSIS — S62.102A LEFT WRIST FRACTURE, CLOSED, INITIAL ENCOUNTER: ICD-10-CM
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
## 2024-04-04 DIAGNOSIS — M25.532 WRIST PAIN, ACUTE, LEFT: ICD-10-CM
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
## 2024-04-04 PROCEDURE — 99203 OFFICE O/P NEW LOW 30 MIN: CPT | Performed by: EMERGENCY MEDICINE

## 2024-04-04 PROCEDURE — 1160F RVW MEDS BY RX/DR IN RCRD: CPT | Performed by: EMERGENCY MEDICINE

## 2024-04-04 PROCEDURE — 73110 X-RAY EXAM OF WRIST: CPT | Mod: LEFT SIDE | Performed by: RADIOLOGY

## 2024-04-04 PROCEDURE — 1159F MED LIST DOCD IN RCRD: CPT | Performed by: EMERGENCY MEDICINE

## 2024-04-04 PROCEDURE — 99213 OFFICE O/P EST LOW 20 MIN: CPT | Performed by: EMERGENCY MEDICINE

## 2024-04-04 PROCEDURE — 73110 X-RAY EXAM OF WRIST: CPT | Mod: LT

## 2024-04-04 NOTE — PROGRESS NOTES
"Sports Medicine Office Note    Today's Date: 4/4/2024     HPI: Ev Loja is a 74 y.o. RHD who presents today for left wrist pain.    Yesterday she was walking in her 's leg was outstretched while he was working on something on the floor when she tripped and fell.  She did not fall on an outstretched hand as she knows this can increase injury so she her arm across her chest.  She still developed significant swelling and pain of her left wrist.  Of note she has had a history of a radius fracture over 20 years ago with plating and screw fixation.  Her wrist had not bothered her since then.  She denies any numbness and tingling in the hands.  She took 1 Tylenol as the pain is minimal when she does not use it.  She is using an Ace wrap but no brace.    Physical Examination:     Left Wrist Exam:    Visual inspection:   Swelling and deformity over the distal forearm and wrist  Range of motion: Limited flexion, extension, and supination of the wrist  Palpation: Tenderness palpation over the distal radius, distal interosseous membrane.  No snuffbox tenderness.  Strength: 4/5 strength of the wrist in all planes due to pain. Decreased  strength.  (-) Piano fermin test  (-) DRUJ Shuck test  (-) TFCC grind test  Can perform \"OK\" sign    Imaging:  Radiographs of the right wrist were reviewed and personally interpreted, which revealed post-surgical hardware with questionable dorsal distal radius fracture.  The studies were reviewed with Dr. Andrew personally in the office today.    Assessment and Plan:     1. Fall at home, initial encounter        2. Wrist pain, acute, left  XR wrist left 3+ views    CT wrist LEFT wo IV contrast        She had a fall and landed on the dorsum of her left wrist.  On exam she has significant swelling and deformity over her dorsal wrist with limited range of motion and strength.  X-ray shows questionable dorsal distal radius fracture, but difficult to tell due to hardware being in place.  " At this time recommend stat CT scan of the wrist to rule out acute fracture and determine surgical intervention.  She is placed in a volar plaster splint in the meantime for immobilization as she is still significantly swollen.  Further management pending CT scan.  If it does not show an acute fracture, we will communicate with her to remove the splint and treat conservatively.  We will follow-up after CT scan.    Mina Ramos DO  Sports Medicine Fellow  [facility] [place]

## 2024-04-04 NOTE — PROGRESS NOTES
Assisted patient with removal of 2 rings from 4th finger.  Patient's spouse present and placed in his pocket.    Applied a well-padded short arm volar splint to patient's LUE per doctor's order.    Dx: questionable L distal radius fracture s/p ORIF from 20 years ago  Patient tolerated the splint application well.  No increased pain reported.  Sensation and blood flow intact.    Educated patient and spouse on splint care and elevation.    Will follow-up after CT scan.    Emily Cruz AT

## 2024-04-05 ENCOUNTER — HOSPITAL ENCOUNTER (OUTPATIENT)
Dept: RADIOLOGY | Facility: CLINIC | Age: 74
End: 2024-04-05
Payer: MEDICARE

## 2024-04-05 ENCOUNTER — HOSPITAL ENCOUNTER (OUTPATIENT)
Dept: RADIOLOGY | Facility: HOSPITAL | Age: 74
Discharge: HOME | End: 2024-04-05
Payer: MEDICARE

## 2024-04-05 DIAGNOSIS — M25.532 WRIST PAIN, ACUTE, LEFT: ICD-10-CM

## 2024-04-05 DIAGNOSIS — S62.102A LEFT WRIST FRACTURE, CLOSED, INITIAL ENCOUNTER: ICD-10-CM

## 2024-04-05 PROCEDURE — 73200 CT UPPER EXTREMITY W/O DYE: CPT | Mod: LEFT SIDE | Performed by: RADIOLOGY

## 2024-04-05 PROCEDURE — 73200 CT UPPER EXTREMITY W/O DYE: CPT | Mod: LT

## 2024-04-11 ENCOUNTER — OFFICE VISIT (OUTPATIENT)
Dept: ORTHOPEDIC SURGERY | Facility: HOSPITAL | Age: 74
End: 2024-04-11
Payer: MEDICARE

## 2024-04-11 DIAGNOSIS — S52.502S: Primary | ICD-10-CM

## 2024-04-11 DIAGNOSIS — S52.602S: Primary | ICD-10-CM

## 2024-04-11 PROCEDURE — 25600 CLTX DST RDL FX/EPHYS SEP WO: CPT | Performed by: EMERGENCY MEDICINE

## 2024-04-11 PROCEDURE — 99213 OFFICE O/P EST LOW 20 MIN: CPT | Performed by: EMERGENCY MEDICINE

## 2024-04-11 PROCEDURE — 29085 APPL CAST HAND&LWR FOREARM: CPT | Performed by: EMERGENCY MEDICINE

## 2024-04-11 PROCEDURE — 1159F MED LIST DOCD IN RCRD: CPT | Performed by: EMERGENCY MEDICINE

## 2024-04-11 PROCEDURE — 1160F RVW MEDS BY RX/DR IN RCRD: CPT | Performed by: EMERGENCY MEDICINE

## 2024-04-11 NOTE — PROGRESS NOTES
"Sports Medicine Office Note    Today's Date: 4/11/2024     HPI: Ev Loja is a 74 y.o. RHD who presents today for left wrist pain.    4/11/24: Patient returns today for reevaluation and CT results of her wrist.  Her pain and swelling have improved significantly since her last visit.  She has tolerated the splint without difficulty.  No additional complaints.    4/4/24: Yesterday she was walking in her 's leg was outstretched while he was working on something on the floor when she tripped and fell.  She did not fall on an outstretched hand as she knows this can increase injury so she her arm across her chest.  She still developed significant swelling and pain of her left wrist.  Of note she has had a history of a radius fracture over 20 years ago with plating and screw fixation.  Her wrist had not bothered her since then.  She denies any numbness and tingling in the hands.  She took 1 Tylenol as the pain is minimal when she does not use it.  She is using an Ace wrap but no brace.    Physical Examination:     Left Wrist Exam:    Visual inspection:   Swelling and deformity over the distal forearm and wrist have improved.  Range of motion: Limited flexion, extension, and supination of the wrist  Palpation: Tenderness palpation over the distal radius, distal interosseous membrane.  No snuffbox tenderness.  Strength: 4/5 strength of the wrist in all planes due to pain. Decreased  strength.  (-) Piano fermin test  (-) DRUJ Shuck test  (-) TFCC grind test  Can perform \"OK\" sign    Imaging:   I have personally reviewed and agree with Radiologist interpretation of previous imaging studies performed prior to this visit.   CT of the  left wrist without intravenous contrast dated  4/5/2024.      INDICATION:  Signs/Symptoms:r/o fracture distal radius around hardware      COMPARISON:  None.      ACCESSION NUMBER(S):  [id number]      ORDERING CLINICIAN:  GERI LANDERS      TECHNIQUE:  Axial CT of the   left wrist was " performed  without intravenous  contrast.  Sagittal and coronal 2 dimensional reformats were obtained.      FINDINGS:  OSSEOUS STRUCTURES AND JOINTS:      The bones are demineralized. There is comminuted intra-articular  fracturing the distal dorsal radius with involvement of Kali's  tubercle. There is mild buckling at the fracturing. There is  extension of the fracturing adjacent to the tips the screws in the  transverse portion the fixation plate as well as adjacent to the tip  the distal most screw in the vertical portion of the T part of the  plate. There is prior fracture deformity of the distal radius with  osseous bridging at the prior fracture deformity. There is prior  fracture of the ulnar styloid with corticated fragmentation. Mild  polyarticular degenerative changes seen of the wrist. No dislocation  is evident.  There is a small volume distal radioulnar joint effusion.      MUSCLES AND TENDONS:      Muscles and tendons are grossly unremarkable as seen on CT.      ASSOCIATED SOFT TISSUES:      Soft tissue swelling is seen about the wrist.      IMPRESSION:  1. Comminuted intra-articular fracturing of the dorsal distal radius  with extension adjacent to the tips of multiple screws for the  fixation plate for prior fracturing.  2. Small volume distal radioulnar joint effusion.  3. Prior fracturing of the distal radius and ulna.      MACRO:  none      Signed by: éCsar Vogel 4/5/2024 2:57 PM  Dictation workstation:   [id number]    Assessment and Plan:     1. Fracture of radius and ulna near wrist, left, sequela            I reviewed imaging and examination findings with patient.  CT does confirm.  He hardware and intra-articular fracture without significant placement.  No significant hardware displacement.  Considering this, I do feel that she can be managed nonoperatively.  Will place her in a short arm fiberglass cast today.  I would like to see her back in 4 weeks for reevaluation.      Festus Andrew  DO  [facility], [facility] Medicine [place]    ** Please excuse any errors in grammar or translation related to this dictation. Voice recognition software was utilized to prepare this document. **

## 2024-04-12 NOTE — PROGRESS NOTES
Applied a well-padded short arm cast to patient's LUE, per DrKristyn's orders.  Dx: L wrist fracture  Patient tolerated cast application without complication.  Neurovascularly intact.    Educated patient on cast care and elevation.    She will follow-up in 4 weeks as directed.    Emily Cruz AT

## 2024-04-17 ENCOUNTER — APPOINTMENT (OUTPATIENT)
Dept: OTOLARYNGOLOGY | Facility: CLINIC | Age: 74
End: 2024-04-17
Payer: MEDICARE

## 2024-04-17 ENCOUNTER — APPOINTMENT (OUTPATIENT)
Dept: AUDIOLOGY | Facility: CLINIC | Age: 74
End: 2024-04-17
Payer: MEDICARE

## 2024-05-06 ENCOUNTER — HOSPITAL ENCOUNTER (OUTPATIENT)
Dept: RADIOLOGY | Facility: HOSPITAL | Age: 74
Discharge: HOME | End: 2024-05-06
Payer: MEDICARE

## 2024-05-06 ENCOUNTER — OFFICE VISIT (OUTPATIENT)
Dept: ORTHOPEDIC SURGERY | Facility: HOSPITAL | Age: 74
End: 2024-05-06
Payer: MEDICARE

## 2024-05-06 VITALS — BODY MASS INDEX: 17 KG/M2 | HEIGHT: 65 IN | WEIGHT: 102 LBS

## 2024-05-06 DIAGNOSIS — S52.602S: Primary | ICD-10-CM

## 2024-05-06 DIAGNOSIS — S52.502S: ICD-10-CM

## 2024-05-06 DIAGNOSIS — S52.502S: Primary | ICD-10-CM

## 2024-05-06 DIAGNOSIS — S52.602S: ICD-10-CM

## 2024-05-06 PROCEDURE — 99213 OFFICE O/P EST LOW 20 MIN: CPT | Performed by: EMERGENCY MEDICINE

## 2024-05-06 PROCEDURE — 1159F MED LIST DOCD IN RCRD: CPT | Performed by: EMERGENCY MEDICINE

## 2024-05-06 PROCEDURE — 1160F RVW MEDS BY RX/DR IN RCRD: CPT | Performed by: EMERGENCY MEDICINE

## 2024-05-06 PROCEDURE — 1036F TOBACCO NON-USER: CPT | Performed by: EMERGENCY MEDICINE

## 2024-05-06 PROCEDURE — 73110 X-RAY EXAM OF WRIST: CPT | Mod: LEFT SIDE | Performed by: RADIOLOGY

## 2024-05-06 PROCEDURE — 1125F AMNT PAIN NOTED PAIN PRSNT: CPT | Performed by: EMERGENCY MEDICINE

## 2024-05-06 PROCEDURE — 99024 POSTOP FOLLOW-UP VISIT: CPT | Performed by: EMERGENCY MEDICINE

## 2024-05-06 PROCEDURE — 73110 X-RAY EXAM OF WRIST: CPT | Mod: LT

## 2024-05-06 PROCEDURE — L3984 UPPER EXT FX ORTHOSIS WRIST: HCPCS | Performed by: EMERGENCY MEDICINE

## 2024-05-06 ASSESSMENT — PAIN SCALES - GENERAL: PAINLEVEL_OUTOF10: 2

## 2024-05-06 ASSESSMENT — PAIN - FUNCTIONAL ASSESSMENT: PAIN_FUNCTIONAL_ASSESSMENT: 0-10

## 2024-05-06 NOTE — PROGRESS NOTES
"Sports Medicine Office Note    Today's Date: 5/6/2024     HPI: Ev Loja is a 74 y.o. RHD who presents today for left wrist pain.    5/6/24: Patient returns today for reevaluation for left distal radius fracture.  She has been compliant with the cast.  No additional injuries or complaints.    4/11/24: Patient returns today for reevaluation and CT results of her wrist.  Her pain and swelling have improved significantly since her last visit.  She has tolerated the splint without difficulty.  No additional complaints.    4/4/24: Yesterday she was walking in her 's leg was outstretched while he was working on something on the floor when she tripped and fell.  She did not fall on an outstretched hand as she knows this can increase injury so she her arm across her chest.  She still developed significant swelling and pain of her left wrist.  Of note she has had a history of a radius fracture over 20 years ago with plating and screw fixation.  Her wrist had not bothered her since then.  She denies any numbness and tingling in the hands.  She took 1 Tylenol as the pain is minimal when she does not use it.  She is using an Ace wrap but no brace.    Physical Examination:     Left Wrist Exam:    Visual inspection:   Swelling and deformity over the distal forearm and wrist have improved.  Range of motion: Limited flexion, extension, and supination of the wrist  Palpation: Tenderness palpation over the distal radius, distal interosseous membrane.  No snuffbox tenderness.  Strength: 4/5 strength of the wrist in all planes due to pain. Decreased  strength.  (-) Piano fermin test  (-) DRUJ Shuck test  (-) TFCC grind test  Can perform \"OK\" sign    Imaging:   I have personally reviewed and agree with Radiologist interpretation of previous imaging studies performed prior to this visit.     Left wrist PA, oblique, lateral: Revisualization of distal radius fracture with interval callus formation and without further " displacement.      Assessment and Plan:     1. Fracture of radius and ulna near wrist, left, sequela  XR wrist left 3+ views            I reviewed imaging and examination findings with patient.  Overall, she feels that she is doing better.  Radiographs are reassuring.  At this point, we will progress her to a short arm Exos splint.  Will place her in this today.  I would like to see her back in 3 weeks for reevaluation.  If she is doing well at that time, we will progress her out of the splint.    Patient was prescribed a short arm Exos splint for distal radius fracture. The patient has weakness, instability and/or deformity of their left wrist which requires stabilization from this orthosis to improve their function.      Verbal and written instructions for the use, wear schedule, cleaning and application of this item were given.  Patient was instructed that should the brace result in increased pain, decreased sensation, increased swelling, or an overall worsening of their medical condition, to please contact our office immediately.     Orthotic management and training was provided for skin care, modifications due to healing tissues, edema changes, interruption in skin integrity, and safety precautions with the orthosis.    Festus Andrew,   Sports Medicine  [facility], [facility] [place]    ** Please excuse any errors in grammar or translation related to this dictation. Voice recognition software was utilized to prepare this document. **

## 2024-05-16 DIAGNOSIS — K21.9 GASTROESOPHAGEAL REFLUX DISEASE WITHOUT ESOPHAGITIS: ICD-10-CM

## 2024-05-16 DIAGNOSIS — J45.40 MODERATE PERSISTENT ASTHMA WITHOUT COMPLICATION (HHS-HCC): ICD-10-CM

## 2024-05-17 RX ORDER — FLUTICASONE FUROATE AND VILANTEROL TRIFENATATE 100; 25 UG/1; UG/1
POWDER RESPIRATORY (INHALATION)
Qty: 180 EACH | Refills: 0 | Status: SHIPPED | OUTPATIENT
Start: 2024-05-17 | End: 2024-05-20

## 2024-05-17 RX ORDER — FAMOTIDINE 40 MG/1
40 TABLET, FILM COATED ORAL NIGHTLY
Qty: 90 TABLET | Refills: 0 | Status: SHIPPED | OUTPATIENT
Start: 2024-05-17

## 2024-05-19 DIAGNOSIS — J45.40 MODERATE PERSISTENT ASTHMA WITHOUT COMPLICATION (HHS-HCC): ICD-10-CM

## 2024-05-20 DIAGNOSIS — J45.40 MODERATE PERSISTENT ASTHMA WITHOUT COMPLICATION (HHS-HCC): Primary | ICD-10-CM

## 2024-05-20 RX ORDER — FLUTICASONE FUROATE AND VILANTEROL TRIFENATATE 100; 25 UG/1; UG/1
POWDER RESPIRATORY (INHALATION)
Qty: 180 EACH | Refills: 0 | Status: SHIPPED | OUTPATIENT
Start: 2024-05-20

## 2024-05-20 RX ORDER — FLUTICASONE FUROATE AND VILANTEROL TRIFENATATE 100; 25 UG/1; UG/1
1 POWDER RESPIRATORY (INHALATION) DAILY
Qty: 1 EACH | Refills: 1 | Status: SHIPPED | OUTPATIENT
Start: 2024-05-20

## 2024-05-30 ENCOUNTER — OFFICE VISIT (OUTPATIENT)
Dept: ORTHOPEDIC SURGERY | Facility: HOSPITAL | Age: 74
End: 2024-05-30
Payer: MEDICARE

## 2024-05-30 DIAGNOSIS — S52.502S: Primary | ICD-10-CM

## 2024-05-30 DIAGNOSIS — S52.602S: Primary | ICD-10-CM

## 2024-05-30 PROCEDURE — 1160F RVW MEDS BY RX/DR IN RCRD: CPT | Performed by: EMERGENCY MEDICINE

## 2024-05-30 PROCEDURE — 99213 OFFICE O/P EST LOW 20 MIN: CPT | Performed by: EMERGENCY MEDICINE

## 2024-05-30 PROCEDURE — 99024 POSTOP FOLLOW-UP VISIT: CPT | Performed by: EMERGENCY MEDICINE

## 2024-05-30 PROCEDURE — 1159F MED LIST DOCD IN RCRD: CPT | Performed by: EMERGENCY MEDICINE

## 2024-05-30 NOTE — PROGRESS NOTES
"Sports Medicine Office Note    Today's Date: 5/30/2024     HPI: Ev Loja is a 74 y.o. RHD who presents today for left wrist pain.    5/30/24: Patient returns today for reevaluation for left distal radius fracture.  She has been compliant with the Exos splint.  She feels that her pain has improved drastically since her last visit.  No additional injuries or complaints.    5/6/24: Patient returns today for reevaluation for left distal radius fracture.  She has been compliant with the cast.  No additional injuries or complaints.    4/11/24: Patient returns today for reevaluation and CT results of her wrist.  Her pain and swelling have improved significantly since her last visit.  She has tolerated the splint without difficulty.  No additional complaints.    4/4/24: Yesterday she was walking in her 's leg was outstretched while he was working on something on the floor when she tripped and fell.  She did not fall on an outstretched hand as she knows this can increase injury so she her arm across her chest.  She still developed significant swelling and pain of her left wrist.  Of note she has had a history of a radius fracture over 20 years ago with plating and screw fixation.  Her wrist had not bothered her since then.  She denies any numbness and tingling in the hands.  She took 1 Tylenol as the pain is minimal when she does not use it.  She is using an Ace wrap but no brace.    Physical Examination:     Left Wrist Exam:    Visual inspection:   Swelling and deformity over the distal forearm and wrist have improved.  Range of motion: Limited flexion and extension, near full supination and pronation.  Palpation: Tenderness palpation over the distal radius has resolved, no distal interosseous membrane tenderness.  No snuffbox tenderness.  Strength: 4+/5 strength of the wrist in all planes due to pain. Decreased  strength.  (-) Piano fermin test  (-) DRUJ Shuck test  (-) TFCC grind test  Can perform \"OK\" " sign      Assessment and Plan:     1. Fracture of radius and ulna near wrist, left, sequela  Referral to Occupational Therapy              Overall, I feel that she is doing well.  She has no tenderness on examination.  She does have decreased range of motion likely secondary to prolonged immobilization.  At this point, I feel that she can wean out of the Exos wrist splint.  I did discuss with patient the importance of aggressive hand therapy to work on strength and range of motion for her wrist.  I have provided her with this referral today.  I would like to see her back as needed if pain returns, otherwise, she can progress to full activities as tolerated.      Festus Andrew, DO  Sports Medicine  [facility], [facility] [place]    ** Please excuse any errors in grammar or translation related to this dictation. Voice recognition software was utilized to prepare this document. **

## 2024-06-13 ENCOUNTER — EVALUATION (OUTPATIENT)
Dept: OCCUPATIONAL THERAPY | Facility: HOSPITAL | Age: 74
End: 2024-06-13
Payer: MEDICARE

## 2024-06-13 DIAGNOSIS — S52.502S: ICD-10-CM

## 2024-06-13 DIAGNOSIS — S52.602S: ICD-10-CM

## 2024-06-13 PROCEDURE — 97110 THERAPEUTIC EXERCISES: CPT | Mod: GO | Performed by: OCCUPATIONAL THERAPIST

## 2024-06-13 PROCEDURE — 97165 OT EVAL LOW COMPLEX 30 MIN: CPT | Mod: GO | Performed by: OCCUPATIONAL THERAPIST

## 2024-06-13 NOTE — PROGRESS NOTES
Occupational Therapy  Occupational Therapy Orthopedic Evaluation    Patient Name: Ev Loja  MRN: [id number]  Today's Date: 6/13/2024       Insurance:  Visit number: 1    General:  Reason for visit: L wrist  Referred by: Dr. Andrew  Time:  Time Calculation  Start Time: 1330  Stop Time: 1400  Time Calculation (min): 30 min  OT Evaluation Time Entry  OT Evaluation (Low) Time Entry: 20  OT Therapeutic Procedures Time Entry  Therapeutic Exercise Time Entry: 10      Insurance Type: Payor: MEDICARE / Plan: MEDICARE PART A AND B / Product Type: *No Product type* /     Current Problem  1. Fracture of radius and ulna near wrist, left, sequela  Referral to Occupational Therapy          Precautions: none       Medical History Form: Reviewed (scanned into chart)    Subjective:   Chief Complaint: L wrist  Onset: 04/03/2024  ERUM: Fall       Hand Dominance: Right    Current Condition since injury:   better      PAIN     Location: dorsal wrist  Intensity: up to 4/10  Description: sharp  Relieving Factors:  Rest     Relevant Information (PMH & Previous Tests/Imaging): reviewed in chart    Prior Level of Function (PLOF)  Exercise/Physical Activity: working out  Work/School: retired  Current ADL/IADL Status: homemaking     Patients Living Environment: Reviewed and no concern    Primary Language: English    Pt goals for therapy: Improve functional use of wrist for cooking, cleaning, working out, squeezing toothpaste, heavier household chores, doing pushups    Red Flags: Do you have any of the following? No  Fever/chills, unexplained weight changes, dizziness/fainting, unexplained change in bowel or bladder functions, unexplained malaise or muscle weakness, night pain/sweats, numbness or tingling  Good rehab potential.  Objective:  Distance to DPC  2nd: 2 cm  3rd: 0 cm  4th: 0 cm  5th: 0 cm  Opposition to base of 5th  Wrist extension/flexion 60/45  Wrist ulnar/radial deviation 25/20  Forearm pronation/supination 75/75   strength  50#/20#  Physical Observation: mild edema along dorsal wrist    Numbness/Tingling: denies numbness/tingling          Outcome Measures:  Quick DASH: 22.72    EDUCATION: home exercise program, plan of care, activity modifications, pain management, and injury pathology       Goals:      Plan of care was developed with input and agreement by the patient    Treatments:     Low complexity evaluation  20 min       Therapeutic Exercise:   10 min  HEP education and completion : composite fist, wrist extension holds, prayer stretch, wrist flexion stretch, wrist ulnar/radial deviation, pre weight bearing into wrist extension, yellow putty for grasping and pinching.      Assessment: Patient is a 75 yo female  s/p L wrist fracture resulting in limited participation in pain-free ADLs and inability to perform at their prior level of function. Pt would benefit from occupational therapy to address the impairments found & listed previously in the objective section in order to return to safe and pain-free ADLs and prior level of function.       Plan: Patient requested one time visit for home program.           Rodriguez Mullins OT

## 2024-06-14 ENCOUNTER — CLINICAL SUPPORT (OUTPATIENT)
Dept: AUDIOLOGY | Facility: CLINIC | Age: 74
End: 2024-06-14
Payer: MEDICARE

## 2024-06-14 DIAGNOSIS — H90.3 ASYMMETRIC SNHL (SENSORINEURAL HEARING LOSS): Primary | ICD-10-CM

## 2024-06-14 DIAGNOSIS — H93.13 TINNITUS OF BOTH EARS: ICD-10-CM

## 2024-06-14 PROCEDURE — 92557 COMPREHENSIVE HEARING TEST: CPT

## 2024-06-14 PROCEDURE — 92550 TYMPANOMETRY & REFLEX THRESH: CPT

## 2024-06-14 NOTE — PROGRESS NOTES
ADULT AUDIOLOGY EVALUATION    Name:  Ev Loja  :  1950  Age:  74 y.o.  Date of Evaluation:  2024    IMPRESSIONS     Today's test results indicate hyper mobile tympanic membranes with normal hearing sensitivity sloping to moderately-severe sensorineural hearing loss in the left ear and mild sloping to severe sensorineural hearing loss in the right ear. Word understanding was excellent, bilaterally. Asymmetries noted 125-3000 and 7863-1886 Hz, right ear being worse.     Ev already has a scheduled ENT appointment for evaluation of asymmetric hearing loss. She was advised to keep this appointment.   It was discussed that she is a hearing aid candidate and would benefit from hearing aids in both ears due to her amount / type of hearing loss. As Ev does not perceive great hearing difficulty at this time, she does not wish to pursue amplification. Annual hearing tests are recommended in order to monitor.     RECOMMENDATIONS     Continue medical follow up with PCP.  Return for annual hearing evaluation or sooner should concerns arise.  Keep upcoming ENT appointment with Amanda Salvador CNP, for medical evaluation of asymmetric hearing loss.   Consider pursing hearing aids pending medical clearance and patient motivation.     Time: 11:30-12:00     HISTORY     Ev Loja, 74 year old female, was seen today for an audiologic evaluation at the referral of Dr. Sasha Ferguson. Ev reported that about a year ago she noticed decreased hearing sensitivity in her right ear. She also endorsed bilateral, intermittent tinnitus. She denied significant hearing concerns as she feels she is still able to communicate and hear generally fine on a daily basis.     Ev dizziness, aural fullness, recent ear infections, otalgia, otorrhea, history of otologic surgeries or history of loud noise exposure.     EVALUATION         TEST RESULTS     Otoscopic Evaluation:  Right Ear: Clear ear canal with unremarkable tympanic  membrane  Left Ear: Clear ear canal with unremarkable tympanic membrane    Tympanometry:   Right Ear: Deep, Ad tympanogram with normal ear canal volume, peak pressure and excessive compliance.  Left Ear: Deep, Ad tympanogram with normal ear canal volume, peak pressure and excessive compliance.    Ipsilateral Acoustic Reflexes:   Right Ear: Present 500-4000 Hz.   Left Ear: Present 500-4000 Hz.     Pure Tone Audiometry (125-8000 Hz):   Right Ear: Mild sloping to severe sensorineural hearing loss.   Left Ear: Normal hearing sensitivity 125-2000 Hz sloping to moderately-severe sensorineural hearing loss through 8000 Hz.     Speech Audiometry:   Right Ear:    Speech Reception Threshold (SRT) was obtained at 30 dBHL.   Word Recognition scores were excellent in quiet when words were presented at 80 dBHL using NU-6 word ordered by difficulty.  Left Ear:    Speech Reception Threshold (SRT) was obtained at 15 dBHL.   Word Recognition scores were excellent in quiet when words were presented at 70 dBHL using NU-6 word ordered by difficulty.     Distortion Product Otoacoustic Emissions:  Right Ear: Absent 8648-1182 Hz.   Left Ear: Present at 2000 Hz, absent at 2725-2728 and 5203-8855 Hz.        Batsheva Chen, CICI, CCC-A  Licensed Clinical Audiologist    Degree of Hearing Sensitivity Decibel Range   Within Normal Limits (WNL) 0-25   Mild 26-40   Moderate 41-55   Moderately-Severe 56-70   Severe 71-90   Profound 91+      Key   CNT/DNT Could Not Test/Did Not Test   TM Tympanic Membrane   WNL Within Normal Limits   HA Hearing Aid   SNHL Sensorineural Hearing Loss   CHL Conductive Hearing Loss   NIHL Noise-Induced Hearing Loss   ECV Ear Canal Volume   MLV Monitored Live Voice     ]

## 2024-06-25 DIAGNOSIS — G47.33 OSA (OBSTRUCTIVE SLEEP APNEA): Primary | ICD-10-CM

## 2024-06-27 ENCOUNTER — DOCUMENTATION (OUTPATIENT)
Dept: SLEEP MEDICINE | Facility: CLINIC | Age: 74
End: 2024-06-27
Payer: MEDICARE

## 2024-06-27 NOTE — PROGRESS NOTES
Patient's current CPAP machine is over 5 years old and is showing signs of malfunction.  She benefits greatly from CPAP therapy with reduced daytime sleepiness, nocturnal awakening.  It is advised that she continues to receive treatment and needs a new machine    Jean Tracy MD

## 2024-06-28 DIAGNOSIS — I10 ESSENTIAL (PRIMARY) HYPERTENSION: ICD-10-CM

## 2024-06-28 RX ORDER — LISINOPRIL 5 MG/1
TABLET ORAL
Qty: 90 TABLET | Refills: 0 | Status: SHIPPED | OUTPATIENT
Start: 2024-06-28

## 2024-07-07 ASSESSMENT — DERMATOLOGY QUALITY OF LIFE (QOL) ASSESSMENT
RATE HOW BOTHERED YOU ARE BY SYMPTOMS OF YOUR SKIN PROBLEM (EG, ITCHING, STINGING BURNING, HURTING OR SKIN IRRITATION): 1
DATE THE QUALITY-OF-LIFE ASSESSMENT WAS COMPLETED: 67028
RATE HOW EMOTIONALLY BOTHERED YOU ARE BY YOUR SKIN PROBLEM (FOR EXAMPLE, WORRY, EMBARRASSMENT, FRUSTRATION): 0 - NEVER BOTHERED
RATE HOW BOTHERED YOU ARE BY EFFECTS OF YOUR SKIN PROBLEMS ON YOUR ACTIVITIES (EG, GOING OUT, ACCOMPLISHING WHAT YOU WANT, WORK ACTIVITIES OR YOUR RELATIONSHIPS WITH OTHERS): 0 - NEVER BOTHERED
WHAT SINGLE SKIN CONDITION LISTED BELOW IS THE PATIENT ANSWERING THE QUALITY-OF-LIFE ASSESSMENT QUESTIONS ABOUT: NONE OF THE ABOVE
RATE HOW BOTHERED YOU ARE BY EFFECTS OF YOUR SKIN PROBLEMS ON YOUR ACTIVITIES (EG, GOING OUT, ACCOMPLISHING WHAT YOU WANT, WORK ACTIVITIES OR YOUR RELATIONSHIPS WITH OTHERS): 0 - NEVER BOTHERED
RATE HOW BOTHERED YOU ARE BY SYMPTOMS OF YOUR SKIN PROBLEM (EG, ITCHING, STINGING BURNING, HURTING OR SKIN IRRITATION): 1
WHAT SINGLE SKIN CONDITION LISTED BELOW IS THE PATIENT ANSWERING THE QUALITY-OF-LIFE ASSESSMENT QUESTIONS ABOUT: NONE OF THE ABOVE
RATE HOW EMOTIONALLY BOTHERED YOU ARE BY YOUR SKIN PROBLEM (FOR EXAMPLE, WORRY, EMBARRASSMENT, FRUSTRATION): 0 - NEVER BOTHERED

## 2024-07-07 ASSESSMENT — PATIENT GLOBAL ASSESSMENT (PGA): WHAT IS THE PGA: PATIENT GLOBAL ASSESSMENT:  2 - MILD

## 2024-07-09 ENCOUNTER — APPOINTMENT (OUTPATIENT)
Dept: DERMATOLOGY | Facility: CLINIC | Age: 74
End: 2024-07-09
Payer: MEDICARE

## 2024-07-09 DIAGNOSIS — L82.1 SEBORRHEIC KERATOSIS: Primary | ICD-10-CM

## 2024-07-09 DIAGNOSIS — Z85.828 PERSONAL HISTORY OF SKIN CANCER: ICD-10-CM

## 2024-07-09 PROCEDURE — 1160F RVW MEDS BY RX/DR IN RCRD: CPT | Performed by: DERMATOLOGY

## 2024-07-09 PROCEDURE — 1036F TOBACCO NON-USER: CPT | Performed by: DERMATOLOGY

## 2024-07-09 PROCEDURE — 99212 OFFICE O/P EST SF 10 MIN: CPT | Performed by: DERMATOLOGY

## 2024-07-09 PROCEDURE — 1159F MED LIST DOCD IN RCRD: CPT | Performed by: DERMATOLOGY

## 2024-07-09 ASSESSMENT — DERMATOLOGY QUALITY OF LIFE (QOL) ASSESSMENT
ARE THERE EXCLUSIONS OR EXCEPTIONS FOR THE QUALITY OF LIFE ASSESSMENT: NO
RATE HOW EMOTIONALLY BOTHERED YOU ARE BY YOUR SKIN PROBLEM (FOR EXAMPLE, WORRY, EMBARRASSMENT, FRUSTRATION): 1
RATE HOW BOTHERED YOU ARE BY SYMPTOMS OF YOUR SKIN PROBLEM (EG, ITCHING, STINGING BURNING, HURTING OR SKIN IRRITATION): 1
DATE THE QUALITY-OF-LIFE ASSESSMENT WAS COMPLETED: 67030
RATE HOW BOTHERED YOU ARE BY EFFECTS OF YOUR SKIN PROBLEMS ON YOUR ACTIVITIES (EG, GOING OUT, ACCOMPLISHING WHAT YOU WANT, WORK ACTIVITIES OR YOUR RELATIONSHIPS WITH OTHERS): 1

## 2024-07-09 ASSESSMENT — DERMATOLOGY PATIENT ASSESSMENT
DO YOU HAVE ANY NEW OR CHANGING LESIONS: YES
DO YOU USE SUNSCREEN: OCCASIONALLY
DO YOU USE A TANNING BED: NO
HAVE YOU HAD OR DO YOU HAVE A STAPH INFECTION: NO
ARE YOU AN ORGAN TRANSPLANT RECIPIENT: NO
ARE YOU ON BIRTH CONTROL: NO
HAVE YOU HAD OR DO YOU HAVE VASCULAR DISEASE: NO
ARE YOU TRYING TO GET PREGNANT: NO
DO YOU HAVE IRREGULAR MENSTRUAL CYCLES: NO
WHERE ARE THESE NEW OR CHANGING LESIONS LOCATED: RT LEG

## 2024-07-09 ASSESSMENT — PATIENT GLOBAL ASSESSMENT (PGA): PATIENT GLOBAL ASSESSMENT: PATIENT GLOBAL ASSESSMENT:  1 - CLEAR

## 2024-07-09 ASSESSMENT — ITCH NUMERIC RATING SCALE: HOW SEVERE IS YOUR ITCHING?: 0

## 2024-07-09 NOTE — PROGRESS NOTES
Subjective     Ev Loja is a 74 y.o. female who presents for the following: Suspicious Skin Lesion (Rt leg).     Last derm visit 1/2/24 for Full Skin Exam - no lesions of concern        Review of Systems:  No other skin or systemic complaints other than what is documented elsewhere in the note.    The following portions of the chart were reviewed this encounter and updated as appropriate:   Tobacco  Allergies  Meds  Problems  Med Hx  Surg Hx         Skin Cancer History  No skin cancer on file.      Specialty Problems          Dermatology Problems    Basal cell carcinoma of skin of other parts of face     history of BCC on right temporal region diagnosed by Dr. Maria Del Rosario Heath on 5/31/19 s/p Mohssurgery by Dr. Villa on 8/8/19          Dermatitis, unspecified    Actinic keratosis    Keratoacanthoma     - biopsied 10/2023, suggestive of a resolving keratoacanthoma. She was recommended Mohs surgery but declines in favor of monitoring for recurrence given her age and location on leg with higher risk of poor wound healing  - 1/2024, scar on exam, no evidence of recurrence             Objective   Well appearing patient in no apparent distress; mood and affect are within normal limits.    A focused skin examination was performed. All findings within normal limits unless otherwise noted below.    Assessment/Plan   1. Seborrheic keratosis  Right Lower Leg - Anterior  Stuck on, waxy macule(s)/papule(s)/plaque(s) with comedo-like openings and milia like cysts, 4 mm    It is located 6.5 cm superior to smooth scar where she had keratoacanthoma     - first appeared 1 month ago, stable, not painful  - no features of keratoacanthoma on exam    -Discussed the nature of the diagnosis  -Reassurance, offered shave biopsy vs. Liquid nitrogen vs observation and she elects observation  - Return to clinic if bleeding, pain, change in size/shape/color, or any other changes       2. Personal history of skin cancer    Personal History of  Non-Melanoma Skin Cancer  -Well healed scar(s) with no evidence of recurrence, particularly that on right shin    -Discussed the need for annual or semi-annual skin examinations and to return sooner if any new or changing lesions are noticed. Patient verbalizes understanding    Related Procedures  Follow Up In Dermatology - Established Patient  Follow Up In Dermatology - Established Patient        Follow up in January for her annual Full Skin Exam

## 2024-07-18 ENCOUNTER — APPOINTMENT (OUTPATIENT)
Dept: PRIMARY CARE | Facility: CLINIC | Age: 74
End: 2024-07-18
Payer: MEDICARE

## 2024-07-18 VITALS
WEIGHT: 102.2 LBS | HEIGHT: 65 IN | OXYGEN SATURATION: 99 % | HEART RATE: 78 BPM | SYSTOLIC BLOOD PRESSURE: 124 MMHG | DIASTOLIC BLOOD PRESSURE: 78 MMHG | BODY MASS INDEX: 17.03 KG/M2 | TEMPERATURE: 97.5 F

## 2024-07-18 DIAGNOSIS — D75.1 POLYCYTHEMIA: ICD-10-CM

## 2024-07-18 DIAGNOSIS — Z12.31 ENCOUNTER FOR SCREENING MAMMOGRAM FOR BREAST CANCER: ICD-10-CM

## 2024-07-18 DIAGNOSIS — Z00.00 ROUTINE GENERAL MEDICAL EXAMINATION AT HEALTH CARE FACILITY: Primary | ICD-10-CM

## 2024-07-18 DIAGNOSIS — I10 BENIGN ESSENTIAL HYPERTENSION: ICD-10-CM

## 2024-07-18 DIAGNOSIS — E46 PROTEIN-CALORIE MALNUTRITION, UNSPECIFIED SEVERITY (MULTI): ICD-10-CM

## 2024-07-18 DIAGNOSIS — D80.3 IGG SUBCLASS DEFICIENCY (MULTI): ICD-10-CM

## 2024-07-18 PROCEDURE — G0439 PPPS, SUBSEQ VISIT: HCPCS | Performed by: FAMILY MEDICINE

## 2024-07-18 PROCEDURE — 3074F SYST BP LT 130 MM HG: CPT | Performed by: FAMILY MEDICINE

## 2024-07-18 PROCEDURE — 1036F TOBACCO NON-USER: CPT | Performed by: FAMILY MEDICINE

## 2024-07-18 PROCEDURE — 1160F RVW MEDS BY RX/DR IN RCRD: CPT | Performed by: FAMILY MEDICINE

## 2024-07-18 PROCEDURE — 1170F FXNL STATUS ASSESSED: CPT | Performed by: FAMILY MEDICINE

## 2024-07-18 PROCEDURE — 1159F MED LIST DOCD IN RCRD: CPT | Performed by: FAMILY MEDICINE

## 2024-07-18 PROCEDURE — 3078F DIAST BP <80 MM HG: CPT | Performed by: FAMILY MEDICINE

## 2024-07-18 ASSESSMENT — ACTIVITIES OF DAILY LIVING (ADL)
GROCERY_SHOPPING: INDEPENDENT
BATHING: INDEPENDENT
DOING_HOUSEWORK: INDEPENDENT
TAKING_MEDICATION: INDEPENDENT
MANAGING_FINANCES: INDEPENDENT
DRESSING: INDEPENDENT

## 2024-07-18 ASSESSMENT — ENCOUNTER SYMPTOMS
OCCASIONAL FEELINGS OF UNSTEADINESS: 0
LOSS OF SENSATION IN FEET: 0
DEPRESSION: 0

## 2024-07-18 ASSESSMENT — PATIENT HEALTH QUESTIONNAIRE - PHQ9
1. LITTLE INTEREST OR PLEASURE IN DOING THINGS: NOT AT ALL
2. FEELING DOWN, DEPRESSED OR HOPELESS: NOT AT ALL
SUM OF ALL RESPONSES TO PHQ9 QUESTIONS 1 AND 2: 0

## 2024-07-18 NOTE — PATIENT INSTRUCTIONS
Immunizations recommended:  --Flu shot every fall.  --Pneumonia shots and covid vaccine have been done.  --Shingrix--has been done.  --Tetanus every 10 years.--yours was done in 2022.  --RSV has been done.     Pap not needed.     Colonoscopy should be done every 10 years after the age of 45, unless there was a previous abnormality, or family history of colon cancer.  Yours was done in 2019, could be repeated after 10 years, but at that point, benefit may not outweigh the risk.     Mammogram screening recommendations are changing, but at this time, I recommend a mammogram every 1-2 years in your 40's, and yearly after the age of 50. Having a family history of breast cancer may change that.  Yours done 7/17/23, so ordered.     You should try to get 150 minutes of exercise weekly.  Be sure to eat a diet rich in fruits and vegetables, and low in saturated fats and sodium.    Make sure to wear sun screen when outside, and check for changing or unusual moles.     Pre-menopause recommendations are for 1000 mg calcium and 1000 units vitamin D3 daily. After menopause calcium recommendation is 1200 mg, with 1000 units of vitamin D3  Bone density is due to evaluate for osteoporosis.     I recommend you get a Living Will and Durable Power of  for Healthcare. These documents state what care you would want if you couldn't speak for yourself. They help your loved ones in caring for you if that happens.   Once you have those forms completed, you can keep a copy on file with your doctor.     Specialists being seen:  Dr. Tracy, sleep medicine  Dr. Ladd, allergist.  Dermatologist as needed.     BP controlled on 5 mg lisinopril.  Check fasting blood work some morning.    Fast for 12 hours prior to having blood drawn.  You should drink plenty of water, and can have black tea or black coffee, if you'd like.     For asthma, continue Breo.     For  acid reflux, taking famotidine 40 mg at bedtime, and is working well.

## 2024-07-18 NOTE — PROGRESS NOTES
"Subjective   Reason for Visit: Ev Loja is an 74 y.o. female here for a Medicare Wellness visit.     Past Medical, Surgical, and Family History reviewed and updated in chart.    Reviewed all medications by prescribing practitioner or clinical pharmacist (such as prescriptions, OTCs, herbal therapies and supplements) and documented in the medical record.    Patient has been doing well.  Breathing has been good.    Had hearing test.  Has to follow up with ENT due to one side being much different than the other.    Not checking BP at home.  No side effects to lisinopril.    Famotidine has been working well.    Doesn't sleep well.  Had change in CPAP and has been helpful.    Exercise regularly, a variety of things, including weights, stretching, cardio.  Eats healthy.  Energy is good.  Weight is stable.        Patient Care Team:  Sasha Ferguson MD as PCP - General     Review of Systems    Objective   Vitals:  /78 (BP Location: Right arm, Patient Position: Sitting, BP Cuff Size: Small adult)   Pulse 78   Temp 36.4 °C (97.5 °F) (Temporal)   Ht 1.651 m (5' 5\")   Wt 46.4 kg (102 lb 3.2 oz)   SpO2 99%   BMI 17.01 kg/m²       Physical Exam  Vitals reviewed.   Constitutional:       Appearance: Normal appearance.   Neck:      Comments: No TMG    Cardiovascular:      Rate and Rhythm: Normal rate and regular rhythm.      Heart sounds: No murmur heard.  Pulmonary:      Effort: Pulmonary effort is normal.      Breath sounds: Normal breath sounds.   Musculoskeletal:      Right lower leg: No edema.      Left lower leg: No edema.   Lymphadenopathy:      Cervical: No cervical adenopathy.   Neurological:      Mental Status: She is alert.   Psychiatric:         Mood and Affect: Mood normal.         Behavior: Behavior normal.         Assessment/Plan   Problem List Items Addressed This Visit       IgG subclass deficiency (Multi)    Protein-calorie malnutrition, unspecified severity (Multi)    Current Assessment & Plan     " Weight is stable, eats well.         Benign essential hypertension    Relevant Orders    Comprehensive Metabolic Panel    Lipid Panel    Polycythemia    Relevant Orders    CBC     Other Visit Diagnoses       Routine general medical examination at health care facility    -  Primary    Encounter for screening mammogram for breast cancer        Relevant Orders    BI mammo bilateral screening tomosynthesis

## 2024-07-24 ENCOUNTER — LAB (OUTPATIENT)
Dept: LAB | Facility: LAB | Age: 74
End: 2024-07-24
Payer: MEDICARE

## 2024-07-24 DIAGNOSIS — I10 BENIGN ESSENTIAL HYPERTENSION: ICD-10-CM

## 2024-07-24 DIAGNOSIS — D75.1 POLYCYTHEMIA: ICD-10-CM

## 2024-07-24 LAB
ALBUMIN SERPL BCP-MCNC: 4.1 G/DL (ref 3.4–5)
ALP SERPL-CCNC: 59 U/L (ref 33–136)
ALT SERPL W P-5'-P-CCNC: 19 U/L (ref 7–45)
ANION GAP SERPL CALC-SCNC: 14 MMOL/L (ref 10–20)
AST SERPL W P-5'-P-CCNC: 20 U/L (ref 9–39)
BILIRUB SERPL-MCNC: 0.5 MG/DL (ref 0–1.2)
BUN SERPL-MCNC: 8 MG/DL (ref 6–23)
CALCIUM SERPL-MCNC: 9.7 MG/DL (ref 8.6–10.6)
CHLORIDE SERPL-SCNC: 102 MMOL/L (ref 98–107)
CHOLEST SERPL-MCNC: 138 MG/DL (ref 0–199)
CHOLESTEROL/HDL RATIO: 2.1
CO2 SERPL-SCNC: 27 MMOL/L (ref 21–32)
CREAT SERPL-MCNC: 0.46 MG/DL (ref 0.5–1.05)
EGFRCR SERPLBLD CKD-EPI 2021: >90 ML/MIN/1.73M*2
ERYTHROCYTE [DISTWIDTH] IN BLOOD BY AUTOMATED COUNT: 13.3 % (ref 11.5–14.5)
GLUCOSE SERPL-MCNC: 101 MG/DL (ref 74–99)
HCT VFR BLD AUTO: 39.7 % (ref 36–46)
HDLC SERPL-MCNC: 64.7 MG/DL
HGB BLD-MCNC: 12.7 G/DL (ref 12–16)
LDLC SERPL CALC-MCNC: 61 MG/DL
MCH RBC QN AUTO: 30.2 PG (ref 26–34)
MCHC RBC AUTO-ENTMCNC: 32 G/DL (ref 32–36)
MCV RBC AUTO: 95 FL (ref 80–100)
NON HDL CHOLESTEROL: 73 MG/DL (ref 0–149)
NRBC BLD-RTO: 0 /100 WBCS (ref 0–0)
PLATELET # BLD AUTO: 473 X10*3/UL (ref 150–450)
POTASSIUM SERPL-SCNC: 4.5 MMOL/L (ref 3.5–5.3)
PROT SERPL-MCNC: 6.6 G/DL (ref 6.4–8.2)
RBC # BLD AUTO: 4.2 X10*6/UL (ref 4–5.2)
SODIUM SERPL-SCNC: 138 MMOL/L (ref 136–145)
TRIGL SERPL-MCNC: 62 MG/DL (ref 0–149)
VLDL: 12 MG/DL (ref 0–40)
WBC # BLD AUTO: 7.6 X10*3/UL (ref 4.4–11.3)

## 2024-07-24 PROCEDURE — 80061 LIPID PANEL: CPT

## 2024-07-24 PROCEDURE — 85027 COMPLETE CBC AUTOMATED: CPT

## 2024-07-24 PROCEDURE — 80053 COMPREHEN METABOLIC PANEL: CPT

## 2024-07-24 PROCEDURE — 36415 COLL VENOUS BLD VENIPUNCTURE: CPT

## 2024-07-26 DIAGNOSIS — R79.89 ELEVATED PLATELET COUNT: ICD-10-CM

## 2024-07-26 DIAGNOSIS — R73.09 ELEVATED GLUCOSE: Primary | ICD-10-CM

## 2024-08-14 ENCOUNTER — HOSPITAL ENCOUNTER (OUTPATIENT)
Dept: RADIOLOGY | Facility: CLINIC | Age: 74
Discharge: HOME | End: 2024-08-14
Payer: MEDICARE

## 2024-08-14 VITALS — WEIGHT: 102 LBS | BODY MASS INDEX: 17 KG/M2 | HEIGHT: 65 IN

## 2024-08-14 DIAGNOSIS — Z12.31 ENCOUNTER FOR SCREENING MAMMOGRAM FOR BREAST CANCER: ICD-10-CM

## 2024-08-14 DIAGNOSIS — K21.9 GASTROESOPHAGEAL REFLUX DISEASE WITHOUT ESOPHAGITIS: ICD-10-CM

## 2024-08-14 PROCEDURE — 77063 BREAST TOMOSYNTHESIS BI: CPT | Performed by: RADIOLOGY

## 2024-08-14 PROCEDURE — 77067 SCR MAMMO BI INCL CAD: CPT | Performed by: RADIOLOGY

## 2024-08-14 PROCEDURE — 77067 SCR MAMMO BI INCL CAD: CPT

## 2024-08-15 RX ORDER — FAMOTIDINE 40 MG/1
40 TABLET, FILM COATED ORAL NIGHTLY
Qty: 90 TABLET | Refills: 1 | Status: SHIPPED | OUTPATIENT
Start: 2024-08-15

## 2024-09-05 ENCOUNTER — LAB (OUTPATIENT)
Dept: LAB | Facility: LAB | Age: 74
End: 2024-09-05
Payer: MEDICARE

## 2024-09-05 DIAGNOSIS — R73.09 ELEVATED GLUCOSE: ICD-10-CM

## 2024-09-05 DIAGNOSIS — R79.89 ELEVATED PLATELET COUNT: ICD-10-CM

## 2024-09-05 LAB
EST. AVERAGE GLUCOSE BLD GHB EST-MCNC: 100 MG/DL
HBA1C MFR BLD: 5.1 %
PLATELET # BLD AUTO: 451 X10*3/UL (ref 150–450)

## 2024-09-05 PROCEDURE — 85049 AUTOMATED PLATELET COUNT: CPT

## 2024-09-05 PROCEDURE — 83036 HEMOGLOBIN GLYCOSYLATED A1C: CPT

## 2024-09-05 PROCEDURE — 36415 COLL VENOUS BLD VENIPUNCTURE: CPT

## 2024-09-11 ENCOUNTER — APPOINTMENT (OUTPATIENT)
Dept: OTOLARYNGOLOGY | Facility: CLINIC | Age: 74
End: 2024-09-11
Payer: MEDICARE

## 2024-09-11 VITALS — HEIGHT: 65 IN | WEIGHT: 101.2 LBS | BODY MASS INDEX: 16.86 KG/M2 | TEMPERATURE: 97.1 F

## 2024-09-11 DIAGNOSIS — H91.8X3 ASYMMETRICAL HEARING LOSS: ICD-10-CM

## 2024-09-11 DIAGNOSIS — H90.3 SENSORINEURAL HEARING LOSS (SNHL) OF BOTH EARS: Primary | ICD-10-CM

## 2024-09-11 DIAGNOSIS — H93.13 TINNITUS OF BOTH EARS: ICD-10-CM

## 2024-09-11 PROCEDURE — 99204 OFFICE O/P NEW MOD 45 MIN: CPT | Performed by: NURSE PRACTITIONER

## 2024-09-11 PROCEDURE — 1159F MED LIST DOCD IN RCRD: CPT | Performed by: NURSE PRACTITIONER

## 2024-09-11 PROCEDURE — 1160F RVW MEDS BY RX/DR IN RCRD: CPT | Performed by: NURSE PRACTITIONER

## 2024-09-11 PROCEDURE — 1036F TOBACCO NON-USER: CPT | Performed by: NURSE PRACTITIONER

## 2024-09-11 ASSESSMENT — PATIENT HEALTH QUESTIONNAIRE - PHQ9
1. LITTLE INTEREST OR PLEASURE IN DOING THINGS: NOT AT ALL
SUM OF ALL RESPONSES TO PHQ9 QUESTIONS 1 AND 2: 0
2. FEELING DOWN, DEPRESSED OR HOPELESS: NOT AT ALL

## 2024-09-11 NOTE — PROGRESS NOTES
Subjective   Patient ID: Ev Loja is a 74 y.o. female who presents for Hearing Loss (Bilateral with ht).  HPI  This patient is referred by audiology for evaluation of asymmetrical hearing loss.  The patient is not accompanied by a family member.   When asked about ear pain, hearing loss, discharge from ear or tinnitus in the affected ear, the patient admits to right greater than left hearing loss for over 1 year.  She is interested in hearing amplification.  She also endorses intermittent bilateral tinnitus.  When asked about a significant past otological history including history of prior ear surgery, noise exposure, exposure to ototoxic drugs or agents, and/or family history of hearing loss, the patient admits to none.      Review of Systems  A comprehensive or 10 points review of the patient's constitutional, neurological, HEENT, pulmonary, cardiovascular and genito-urinary systems showed only those mentioned in history of present illness.    Objective   Physical Exam  Constitutional: no fever, chills, weight loss or weight gain   General appearance: Appears well, well-nourished, well groomed. No acute distress.   Communication: Normal communication   Psychiatric: Oriented to person, place and time. Normal mood and affect.   Neurologic: Cranial nerves II-XII grossly intact and symmetric bilaterally.   Head and Face:   Head: Atraumatic with no masses, lesions or scarring.   Face: Normal symmetry, no paralysis, synkinesis or facial tic. No scars or deformities.   TMJ: Normal, no trismus.   Eyes: Conjunctiva not edematous or erythematous   Ears: External inspection of ears with no deformity, scars or masses. Bilateral EACs clear and bilateral TMs intact with no signs of effusions   Nose: External inspection of nose: No nasal lesions, lacerations or scars.   Neck: Normal appearing, symmetric, trachea midline.   Cardiovascular: Examination of peripheral vascular system shows no clubbing or cyanosis.   Respiratory:  No respiratory distress increased work of breathing. Inspection of the chest with symmetric chest expansion and normal respiratory effort.   Skin: No rashes in the head or neck  Bedside occulomotor function assessment for ocular pursuits and saccades, spontaneous nystagmus,  positional and postural testing (Romberg, Fukuta, and bilateral head thrust) is normal.    My interpretation of the audiogram done 6/14/2024 is right-sided with mild sloping to moderate sensorineural hearing loss with excellent word recognition score and normal tympanogram.  Left side with normal hearing through 2000 Hz sloping to moderate sensorineural hearing loss with excellent word recognition score and normal tympanogram.    Assessment/Plan        This patient presents for initial evaluation of chronic acquired bilateral/asymmetrical sensorineural hearing loss and bilateral subjective tinnitus.    Reassurance given that otologic exam and balance testing today are normal.  Audiogram was reviewed in detail.  We discussed possible etiologies of asymmetry including viral inflammation, vascular insufficiency, noise exposure, acoustic neuroma.  I recommended MRI IAC with and without contrast to further evaluate.  I am happy to discuss those results over the phone.  I recommended that she contact her insurance provider to see if she has coverage for hearing aids.  She was given a copy of her audiogram as well as a signed hearing aid clearance form.  All questions were answered to patient's satisfaction.    This note was created using speech recognition transcription software. Despite proofreading, several typographical errors might be present that might affect the meaning of the content. Please call with any questions.      DUC Venegas 09/11/24 5:34 PM

## 2024-09-16 ENCOUNTER — OFFICE VISIT (OUTPATIENT)
Dept: URGENT CARE | Age: 74
End: 2024-09-16
Payer: MEDICARE

## 2024-09-16 VITALS
RESPIRATION RATE: 18 BRPM | TEMPERATURE: 98.3 F | DIASTOLIC BLOOD PRESSURE: 72 MMHG | BODY MASS INDEX: 16.97 KG/M2 | HEART RATE: 87 BPM | WEIGHT: 102 LBS | OXYGEN SATURATION: 98 % | SYSTOLIC BLOOD PRESSURE: 121 MMHG

## 2024-09-16 DIAGNOSIS — U07.1 COVID-19: ICD-10-CM

## 2024-09-16 ASSESSMENT — PAIN SCALES - GENERAL: PAINLEVEL: 0-NO PAIN

## 2024-09-16 NOTE — PROGRESS NOTES
Subjective   Patient ID: Ev Loja is a 74 y.o. female. They present today with a chief complaint of Request For Covid Testing.    History of Present Illness  Patient reports 2 days of fatigue, chest congestion, slight cough. Denies fever. Denies ear pain. Notes hx of asthma. Interested in paxlovid.     Past Medical History  Allergies as of 09/16/2024    (No Known Allergies)       (Not in a hospital admission)       Past Medical History:   Diagnosis Date    Basal cell carcinoma     Personal history of other diseases of the circulatory system 02/27/2018    History of hypertension    Personal history of other diseases of the respiratory system 02/27/2018    History of asthma       Past Surgical History:   Procedure Laterality Date    HYSTERECTOMY  2004    OTHER SURGICAL HISTORY  07/13/2021    Hysterectomy total abdominal with removal of both ovaries    SINUS SURGERY  02/27/2018    Sinus Surgery        reports that she has never smoked. She has never been exposed to tobacco smoke. She has never used smokeless tobacco. She reports that she does not currently use alcohol. She reports that she does not use drugs.                               Objective    Vitals:    09/16/24 1615   BP: 121/72   BP Location: Right arm   Patient Position: Sitting   Pulse: 87   Resp: 18   Temp: 36.8 °C (98.3 °F)   TempSrc: Oral   SpO2: 98%   Weight: 46.3 kg (102 lb)     No LMP recorded. Patient has had a hysterectomy.    Physical Exam  Constitutional:       General: She is not in acute distress.     Appearance: Normal appearance. She is not toxic-appearing or diaphoretic.   HENT:      Head: Normocephalic.      Right Ear: Tympanic membrane, ear canal and external ear normal. There is no impacted cerumen.      Left Ear: Tympanic membrane, ear canal and external ear normal. There is no impacted cerumen.      Nose: No rhinorrhea.      Mouth/Throat:      Pharynx: No oropharyngeal exudate or posterior oropharyngeal erythema.   Eyes:       General: No scleral icterus.        Right eye: No discharge.         Left eye: No discharge.      Extraocular Movements: Extraocular movements intact.   Cardiovascular:      Rate and Rhythm: Normal rate and regular rhythm.      Comments: Appears well perfused  Pulmonary:      Effort: Pulmonary effort is normal. No respiratory distress.      Breath sounds: No wheezing or rales.   Skin:     General: Skin is warm.   Neurological:      Mental Status: She is alert.   Psychiatric:         Mood and Affect: Mood normal.         Behavior: Behavior normal.         Thought Content: Thought content normal.      Comments: Pleasant         Procedures    Point of Care Test & Imaging Results from this visit:  Results for orders placed or performed in visit on 09/05/24   Hemoglobin A1C   Result Value Ref Range    Hemoglobin A1C 5.1 see below %    Estimated Average Glucose 100 Not Established mg/dL   Platelet count   Result Value Ref Range    Platelets 451 (H) 150 - 450 x10*3/uL       Diagnostic study results (if any) were reviewed by Reji Story MD.    Assessment/Plan   Allergies, medications, history, and pertinent labs/EKGs/Imaging reviewed by Reji Story MD.     Medical Decision Making:    Patient is COVID positive.  HDS, satting >98% on RA. No reported hx of CKD. After discussing risks and benefit, through shared decision making, patient preference, will send Paxlovid. Follow up as needed. ER if symptoms worsen    Orders and Diagnoses  Diagnoses and all orders for this visit:  COVID-19  -     POCT Covid-19 Rapid Antigen  -     nirmatrelvir-ritonavir (Paxlovid) 300 mg (150 mg x 2)-100 mgi tablet therapy pack; Take 3 tablets by mouth 2 times a day for 5 days. Follow the instructions on the package      Patient disposition: Home    Medical Admin Record    Follow Up Instructions  No follow-ups on file.    Electronically signed by Reji Story MD  5:06 PM

## 2024-09-18 ENCOUNTER — APPOINTMENT (OUTPATIENT)
Dept: RADIOLOGY | Facility: CLINIC | Age: 74
End: 2024-09-18
Payer: MEDICARE

## 2024-09-27 ENCOUNTER — OFFICE VISIT (OUTPATIENT)
Dept: SLEEP MEDICINE | Facility: CLINIC | Age: 74
End: 2024-09-27
Payer: MEDICARE

## 2024-09-27 VITALS
DIASTOLIC BLOOD PRESSURE: 67 MMHG | SYSTOLIC BLOOD PRESSURE: 126 MMHG | HEIGHT: 65 IN | HEART RATE: 90 BPM | BODY MASS INDEX: 17.03 KG/M2 | WEIGHT: 102.2 LBS

## 2024-09-27 DIAGNOSIS — G47.00 INSOMNIA, UNSPECIFIED TYPE: ICD-10-CM

## 2024-09-27 DIAGNOSIS — I10 BENIGN ESSENTIAL HYPERTENSION: ICD-10-CM

## 2024-09-27 DIAGNOSIS — G47.33 OSA (OBSTRUCTIVE SLEEP APNEA): Primary | ICD-10-CM

## 2024-09-27 PROCEDURE — 99214 OFFICE O/P EST MOD 30 MIN: CPT | Performed by: STUDENT IN AN ORGANIZED HEALTH CARE EDUCATION/TRAINING PROGRAM

## 2024-09-27 PROCEDURE — 3074F SYST BP LT 130 MM HG: CPT | Performed by: STUDENT IN AN ORGANIZED HEALTH CARE EDUCATION/TRAINING PROGRAM

## 2024-09-27 PROCEDURE — 1159F MED LIST DOCD IN RCRD: CPT | Performed by: STUDENT IN AN ORGANIZED HEALTH CARE EDUCATION/TRAINING PROGRAM

## 2024-09-27 PROCEDURE — 1036F TOBACCO NON-USER: CPT | Performed by: STUDENT IN AN ORGANIZED HEALTH CARE EDUCATION/TRAINING PROGRAM

## 2024-09-27 PROCEDURE — 1160F RVW MEDS BY RX/DR IN RCRD: CPT | Performed by: STUDENT IN AN ORGANIZED HEALTH CARE EDUCATION/TRAINING PROGRAM

## 2024-09-27 PROCEDURE — G2211 COMPLEX E/M VISIT ADD ON: HCPCS | Performed by: STUDENT IN AN ORGANIZED HEALTH CARE EDUCATION/TRAINING PROGRAM

## 2024-09-27 PROCEDURE — 3078F DIAST BP <80 MM HG: CPT | Performed by: STUDENT IN AN ORGANIZED HEALTH CARE EDUCATION/TRAINING PROGRAM

## 2024-09-27 ASSESSMENT — SLEEP AND FATIGUE QUESTIONNAIRES
HOW LIKELY ARE YOU TO NOD OFF OR FALL ASLEEP WHEN YOU ARE A PASSENGER IN A CAR FOR AN HOUR WITHOUT A BREAK: WOULD NEVER DOZE
HOW LIKELY ARE YOU TO NOD OFF OR FALL ASLEEP WHILE SITTING AND TALKING TO SOMEONE: WOULD NEVER DOZE
HOW LIKELY ARE YOU TO NOD OFF OR FALL ASLEEP WHILE LYING DOWN TO REST IN THE AFTERNOON WHEN CIRCUMSTANCES PERMIT: WOULD NEVER DOZE
DIFFICULTY_STAYING_ASLEEP: MODERATE
HOW LIKELY ARE YOU TO NOD OFF OR FALL ASLEEP IN A CAR, WHILE STOPPED FOR A FEW MINUTES IN TRAFFIC: WOULD NEVER DOZE
HOW LIKELY ARE YOU TO NOD OFF OR FALL ASLEEP WHILE WATCHING TV: WOULD NEVER DOZE
ESS-CHAD TOTAL SCORE: 0
SLEEP_PROBLEM_INTERFERES_DAILY_ACTIVITIES: A LITTLE
WORRIED_DISTRESSED_DUE_TO_SLEEP: A LITTLE
SITING INACTIVE IN A PUBLIC PLACE LIKE A CLASS ROOM OR A MOVIE THEATER: WOULD NEVER DOZE
SATISFACTION_WITH_CURRENT_SLEEP_PATTERN: SATISFIED
HOW LIKELY ARE YOU TO NOD OFF OR FALL ASLEEP WHILE SITTING AND READING: WOULD NEVER DOZE
HOW LIKELY ARE YOU TO NOD OFF OR FALL ASLEEP WHILE SITTING QUIETLY AFTER LUNCH WITHOUT ALCOHOL: WOULD NEVER DOZE
SLEEP_PROBLEM_NOTICEABLE_TO_OTHERS: SOMEWHAT

## 2024-09-27 ASSESSMENT — ENCOUNTER SYMPTOMS
PSYCHIATRIC NEGATIVE: 1
RESPIRATORY NEGATIVE: 1
CONSTITUTIONAL NEGATIVE: 1
CARDIOVASCULAR NEGATIVE: 1
NEUROLOGICAL NEGATIVE: 1

## 2024-09-27 NOTE — ASSESSMENT & PLAN NOTE
BP Readings from Last 1 Encounters:   09/27/24 126/67     - doing well, asymptomatic  - discussed at length the impact of untreated RENE and BP control  - continue current management and follow-up with PCP

## 2024-09-27 NOTE — PROGRESS NOTES
Patient: Ev Loja    [id number]  : 1950 -- AGE 74 y.o.    Provider: Jean Tracy MD     Location [facility]   Service Date: 2024              [facility]  Followup Visit Note    ASSESSMENT/PLAN     Ms. Loja is a 74 y.o. female and she returns in followup to the [facility] for the problems listed below.    Problem List, Orders, Assessment, Recommendations:  Problem List Items Addressed This Visit             ICD-10-CM    Benign essential hypertension I10     BP Readings from Last 1 Encounters:   24 126/67     - doing well, asymptomatic  - discussed at length the impact of untreated RENE and BP control  - continue current management and follow-up with PCP           Insomnia G47.00     Much better now with the new machine and some tips she got from me previously  GERD is better controlled now as well with current regimen         RENE (obstructive sleep apnea) - Primary G47.33     - got her new machine, loves it.  Feels like she sleeps better with it.  Great compliance  - doing well with PAP therapy  - continue current setting  - renew PAP supply orders           Relevant Orders    Positive Airway Pressure (PAP) Therapy       Disposition    Return to clinic in 12 months         HISTORY OF PRESENT ILLNESS     HISTORY OF PRESENT ILLNESS   Ev Loja is a 74 y.o. female with h/o RENE and Insomnia who presents to a [facility] for followup.     Assessment and plan from last visit: 1/10/2024    Ms. Loja is a 73 y.o. female and she returns in followup to the [facility] for RENE and Insomnia.     Problem List, Orders, Assessment, Recommendations:     #RENE  -initially diagnosed in 2018 and has been very well controlled   -continues to have perfect adherence with CPAP, AHI .2  -continue with CPAP 10cwp   -renew supply orders     #HTN  -BP today 118/75  -well controlled on  lisinopril   -will continue to follow with PCP     #Sleep maintenance insomnia/GERD  -felt in the past that she was waking up in the middle of the night due to digestive issues   -now on famotidine and psyllium which is controlling GERD sx but insomnia sx persist   -today we discussed 1) getting out of bed when she cannot sleep/doing something else and 2) paradoxical intention     Disposition     Return to clinic in 12 months    Current History    On today's visit, the patient reports that she likes her new machine and is doing well with it.  New setting of 8-12 cwp was nice.  She doesn't feel overwhelming pressure as she sometimes felt with the old machine.  She is very happy with the therapy and feels like she is sleeping through the night a lot better.  Totally benefiting from PAP therapy.    PAP Info  DURABLE MEDICAL EQUIPMENT COMPANY: [employer]  Issues with therapy: ISSUES WITH THERAPY: None  Benefits with PAP: PERCEIVED BENEFITS OF PAP: refreshing sleep    PAP Adherence  A PAP adherence download was obtained and data was reviewed personally today in clinic.    RLS Followup:   none.    Daytime Symptoms    Patient reports DAYTIME SYMPTOMS: no daytime symptoms  Patient denies daytime symptoms including: Denies: excessive daytime sleepiness    Naps: No  Fatigue: denies feeling fatigue    ESS: 0  BOO: 7  FOSQ: 40    REVIEW OF SYSTEMS     REVIEW OF SYSTEMS  Review of Systems   Constitutional: Negative.    HENT: Negative.     Respiratory: Negative.     Cardiovascular: Negative.    Genitourinary: Negative.    Skin: Negative.    Neurological: Negative.    Psychiatric/Behavioral: Negative.           ALLERGIES AND MEDICATIONS     ALLERGIES  No Known Allergies    MEDICATIONS: She has a current medication list which includes the following prescription(s): bone essentials - Take by mouth, famotidine - TAKE 1 TABLET (40 MG) BY MOUTH ONCE DAILY AT BEDTIME, fluticasone - Administer into affected nostril(s), breo  "ellipta - 1 PUFF INTO THE LUNGS ONCE DAILY, and lisinopril - TAKE 1 TABLET BY MOUTH EVERY DAY.    PAST MEDICAL HISTORY : She  has a past medical history of Basal cell carcinoma, Personal history of other diseases of the circulatory system (02/27/2018), and Personal history of other diseases of the respiratory system (02/27/2018).    PAST SURGICAL HISTORY: She  has a past surgical history that includes Sinus surgery (02/27/2018); Other surgical history (07/13/2021); and Hysterectomy (2004).     FAMILY HISTORY: No changes since previous visit. Otherwise non-contributory as charted.     SOCIAL HISTORY  She  reports that she has never smoked. She has never been exposed to tobacco smoke. She has never used smokeless tobacco. She reports that she does not currently use alcohol. She reports that she does not use drugs.       PHYSICAL EXAM     VITAL SIGNS: /67 (BP Location: Right arm, Patient Position: Sitting, BP Cuff Size: Adult)   Pulse 90   Ht 1.651 m (5' 5\")   Wt 46.4 kg (102 lb 3.2 oz)   BMI 17.01 kg/m²      PREVIOUS WEIGHTS:  Wt Readings from Last 3 Encounters:   09/27/24 46.4 kg (102 lb 3.2 oz)   09/16/24 46.3 kg (102 lb)   09/11/24 45.9 kg (101 lb 3.2 oz)         RESULTS/DATA     Bicarbonate (mmol/L)   Date Value   07/24/2024 27   07/26/2022 25   07/13/2021 28               "

## 2024-09-27 NOTE — ASSESSMENT & PLAN NOTE
Much better now with the new machine and some tips she got from me previously  GERD is better controlled now as well with current regimen

## 2024-09-27 NOTE — ASSESSMENT & PLAN NOTE
- got her new machine, loves it.  Feels like she sleeps better with it.  Great compliance  - doing well with PAP therapy  - continue current setting  - renew PAP supply orders

## 2024-09-29 DIAGNOSIS — I10 ESSENTIAL (PRIMARY) HYPERTENSION: ICD-10-CM

## 2024-09-29 RX ORDER — LISINOPRIL 5 MG/1
TABLET ORAL
Qty: 90 TABLET | Refills: 1 | Status: SHIPPED | OUTPATIENT
Start: 2024-09-29

## 2024-09-30 ENCOUNTER — HOSPITAL ENCOUNTER (OUTPATIENT)
Dept: RADIOLOGY | Facility: CLINIC | Age: 74
Discharge: HOME | End: 2024-09-30
Payer: MEDICARE

## 2024-09-30 DIAGNOSIS — H91.8X3 ASYMMETRICAL HEARING LOSS: ICD-10-CM

## 2024-09-30 PROCEDURE — 70553 MRI BRAIN STEM W/O & W/DYE: CPT

## 2024-09-30 PROCEDURE — 70553 MRI BRAIN STEM W/O & W/DYE: CPT | Performed by: RADIOLOGY

## 2024-09-30 PROCEDURE — A9575 INJ GADOTERATE MEGLUMI 0.1ML: HCPCS | Performed by: NURSE PRACTITIONER

## 2024-09-30 PROCEDURE — 2550000001 HC RX 255 CONTRASTS: Performed by: NURSE PRACTITIONER

## 2024-09-30 RX ORDER — GADOTERATE MEGLUMINE 376.9 MG/ML
10 INJECTION INTRAVENOUS
Status: COMPLETED | OUTPATIENT
Start: 2024-09-30 | End: 2024-09-30

## 2024-10-04 DIAGNOSIS — R90.89 ABNORMAL FINDING ON MRI OF BRAIN: Primary | ICD-10-CM

## 2024-10-10 ENCOUNTER — OFFICE VISIT (OUTPATIENT)
Dept: CARDIOLOGY | Facility: CLINIC | Age: 74
End: 2024-10-10
Payer: MEDICARE

## 2024-10-10 VITALS
BODY MASS INDEX: 16.16 KG/M2 | WEIGHT: 97 LBS | DIASTOLIC BLOOD PRESSURE: 62 MMHG | OXYGEN SATURATION: 97 % | HEART RATE: 88 BPM | SYSTOLIC BLOOD PRESSURE: 102 MMHG

## 2024-10-10 DIAGNOSIS — I83.90 ASYMPTOMATIC VARICOSE VEINS: Primary | ICD-10-CM

## 2024-10-10 PROCEDURE — 3078F DIAST BP <80 MM HG: CPT | Performed by: INTERNAL MEDICINE

## 2024-10-10 PROCEDURE — 99213 OFFICE O/P EST LOW 20 MIN: CPT | Performed by: INTERNAL MEDICINE

## 2024-10-10 PROCEDURE — 99203 OFFICE O/P NEW LOW 30 MIN: CPT | Performed by: INTERNAL MEDICINE

## 2024-10-10 PROCEDURE — 1123F ACP DISCUSS/DSCN MKR DOCD: CPT | Performed by: INTERNAL MEDICINE

## 2024-10-10 PROCEDURE — 1036F TOBACCO NON-USER: CPT | Performed by: INTERNAL MEDICINE

## 2024-10-10 PROCEDURE — 1159F MED LIST DOCD IN RCRD: CPT | Performed by: INTERNAL MEDICINE

## 2024-10-10 PROCEDURE — 1126F AMNT PAIN NOTED NONE PRSNT: CPT | Performed by: INTERNAL MEDICINE

## 2024-10-10 PROCEDURE — 1160F RVW MEDS BY RX/DR IN RCRD: CPT | Performed by: INTERNAL MEDICINE

## 2024-10-10 PROCEDURE — 3074F SYST BP LT 130 MM HG: CPT | Performed by: INTERNAL MEDICINE

## 2024-10-10 ASSESSMENT — PAIN SCALES - GENERAL: PAINLEVEL: 0-NO PAIN

## 2024-10-10 NOTE — PROGRESS NOTES
Referred by self        History of Present Illness:  Ev Loja is a/an 74 y.o. woman with long history of asymptomatic varicose veins here for evaluation. Her parents and sister had varicose veins. Hers do not bother her other than the cosmetics, but she wanted to ensure that they were not a health concern. She has never had bleeding or thrombosis of her varicose veins. She does not have swelling. She wears compression socks from time to time.    Past Medical History:   Diagnosis Date    Basal cell carcinoma     Personal history of other diseases of the circulatory system 02/27/2018    History of hypertension    Personal history of other diseases of the respiratory system 02/27/2018    History of asthma     Past Surgical History:   Procedure Laterality Date    HYSTERECTOMY  2004    OTHER SURGICAL HISTORY  07/13/2021    Hysterectomy total abdominal with removal of both ovaries    SINUS SURGERY  02/27/2018    Sinus Surgery     Social History     Tobacco Use    Smoking status: Never     Passive exposure: Never    Smokeless tobacco: Never   Vaping Use    Vaping status: Never Used   Substance Use Topics    Alcohol use: Not Currently    Drug use: Never     Family History   Problem Relation Name Age of Onset    Asthma Mother      Sleep apnea Mother      Sleep apnea Father      Other (sinus problems) Father       Current Outpatient Medications   Medication Sig Dispense Refill    calcium-vits E0-A-C1-minerals (Bone Essentials) 166.75 mg- 166.75 unit capsule Take by mouth.      famotidine (Pepcid) 40 mg tablet TAKE 1 TABLET (40 MG) BY MOUTH ONCE DAILY AT BEDTIME. 90 tablet 1    fluticasone (Children's Flonase Allergy Rlf) 50 mcg/actuation nasal spray Administer into affected nostril(s).      fluticasone furoate-vilanteroL (Breo Ellipta) 100-25 mcg/dose inhaler 1 PUFF INTO THE LUNGS ONCE DAILY 180 each 0    lisinopril 5 mg tablet TAKE 1 TABLET BY MOUTH EVERY DAY 90 tablet 1     No current facility-administered medications  for this visit.       Physical Examination:  Blood pressure 102/62, pulse 88, weight (!) 44 kg (97 lb), SpO2 97%.  No distress  No leg swelling  No chronic skin changes  Cluster of varicose veins left mid medial calf  No erythema or tenderness      Pertinent Labs:    Pertinent Imaging:    Diagnoses and all orders for this visit:  Asymptomatic varicose veins (Primary)  Offered reassurance  Follow up as needed    Kelsi Garcia MD, MS

## 2024-10-12 DIAGNOSIS — J45.40 MODERATE PERSISTENT ASTHMA WITHOUT COMPLICATION (HHS-HCC): ICD-10-CM

## 2024-10-12 RX ORDER — FLUTICASONE FUROATE AND VILANTEROL TRIFENATATE 100; 25 UG/1; UG/1
1 POWDER RESPIRATORY (INHALATION) DAILY
Qty: 60 EACH | Refills: 1 | Status: SHIPPED | OUTPATIENT
Start: 2024-10-12

## 2024-11-04 ENCOUNTER — OFFICE VISIT (OUTPATIENT)
Dept: NEUROLOGY | Facility: CLINIC | Age: 74
End: 2024-11-04
Payer: MEDICARE

## 2024-11-04 VITALS
SYSTOLIC BLOOD PRESSURE: 119 MMHG | BODY MASS INDEX: 17.16 KG/M2 | HEIGHT: 65 IN | HEART RATE: 94 BPM | DIASTOLIC BLOOD PRESSURE: 72 MMHG | WEIGHT: 103 LBS

## 2024-11-04 DIAGNOSIS — I10 BENIGN ESSENTIAL HYPERTENSION: ICD-10-CM

## 2024-11-04 DIAGNOSIS — I67.9 SMALL VESSEL DISEASE, CEREBROVASCULAR: Primary | ICD-10-CM

## 2024-11-04 DIAGNOSIS — G47.33 OSA (OBSTRUCTIVE SLEEP APNEA): ICD-10-CM

## 2024-11-04 PROCEDURE — 3078F DIAST BP <80 MM HG: CPT | Performed by: PSYCHIATRY & NEUROLOGY

## 2024-11-04 PROCEDURE — 99203 OFFICE O/P NEW LOW 30 MIN: CPT | Performed by: PSYCHIATRY & NEUROLOGY

## 2024-11-04 PROCEDURE — 1159F MED LIST DOCD IN RCRD: CPT | Performed by: PSYCHIATRY & NEUROLOGY

## 2024-11-04 PROCEDURE — 1123F ACP DISCUSS/DSCN MKR DOCD: CPT | Performed by: PSYCHIATRY & NEUROLOGY

## 2024-11-04 PROCEDURE — 1160F RVW MEDS BY RX/DR IN RCRD: CPT | Performed by: PSYCHIATRY & NEUROLOGY

## 2024-11-04 PROCEDURE — 1036F TOBACCO NON-USER: CPT | Performed by: PSYCHIATRY & NEUROLOGY

## 2024-11-04 PROCEDURE — 99213 OFFICE O/P EST LOW 20 MIN: CPT | Mod: GC | Performed by: PSYCHIATRY & NEUROLOGY

## 2024-11-04 PROCEDURE — 3074F SYST BP LT 130 MM HG: CPT | Performed by: PSYCHIATRY & NEUROLOGY

## 2024-11-04 ASSESSMENT — ACTIVITIES OF DAILY LIVING (ADL)
BED_TO_CHAIR_AND_BACK: INDEPENDENT
TOILET_USE: INDEPENDENT (ON AND OFF, DRESSING, WIPING)
BOWELS: INDEPENDENT (INCLUDING BUTTONS, ZIPS, LACES, ETC.)
DRESSING: INDEPENDENT (INCLUDING BUTTONS, ZIPS, LACES,ETC.)
BATHING: INDEPENDENT (OR IN SHOWER)
STAIRS: INDEPENDENT
GROOMING: INDEPENDENT FACE/HAIR/TEETH.SHAVING (IMPLEMENTS PROVIDED)
BLADDER: CONTINENT
TOTAL_SCORE: 100
MOBILITY_LEVEL_SURFACES: INDEPENDENT (BUT MAY USE ANY AID FOR EXAMPLE, STICK) > 50 YARDS
FEEDING: INDEPENDENT

## 2024-11-04 NOTE — PROGRESS NOTES
[facility]   Ev Loja is a 74 y.o. year old female presenting for initial evaluation .   Referred by: Sasha Ferguson MD  PCP: Sasha Ferguson MD    Ev Loja is a 75 yo F with PMHx HTN, sensorineural hearing loss, GERD, polycythemia, IgG deficiency, asthma, insomnia and RENE who presents for evaluation after MRI revealed cerebrovascular disease. She received an MRI IAC after ENT evaluation revealed asymmetric sensoriuneural hearing loss which revealed moderate chronic small vessel ischemic disease, multiple remote lacunar infarcts of bilateral basal ganglia, and mild-moderate generalized atrophy.     Denies any neurologic symptoms including weakness, changes in balance or coordination, memory, mood, personality, paresthesias, or weakness.     Mother and grandmothers suffered stroke (mothers was hemorrhagic). She denies other family history of neurologic diagnoses.     Personal history: retired for 7 years, lives with  and animals (2 indoor cats, 3 outdoor cats, one dog). Spends a lot of time exercising. Focuses on whole nutrition.     Sleep is not great. Struggles with insomnia, worsened with CPAP. She does wear her CPAP regularly.     No tobacco, alcohol, or other substance use.      Extensive review of notes in EMR, labs, tests, Interpretation of neuroimaging  HbA1c 5.1 9/5/24  Lipid panel wnl 7/24/24    No EKG or TTE on file    CT cardiac scoring 2/1/24:  FINDINGS:  The score and distribution of calcium in the coronary arteries is as  follows:      LM 0  LAD 0  LCx 0  RCA 0      Total 0      The visualized mid/lower ascending thoracic aorta measures 3.1 cm in  diameter. Mild calcifications of the visualized aorta. The heart is  normal in size. No pericardial effusion is present.      No gross evidence of mediastinal or hilar lymphadenopathy or masses  is identified. The visualized segments of the lungs are normally  expanded.      The visualized  "subdiaphragmatic structures appear intact.      IMPRESSION:  1. Coronary artery calcium score of 0*.      *Coronary artery calcium scoring may be helpful in predicting the  risk for future coronary heart disease events.  According to the  American [employer] Foundation Clinical Expert Consensus  Task Force, such testing provides important prognostic information in  patients with more than one coronary heart disease risk factor. The  coronary artery calcium score correlates with the annual risk of a  non-fatal myocardial infarction or coronary heart disease death.      Coronary artery score            Annual Risk      0-99                             0.4%  100-399                        1.3%  >400                            2.4%      These three \"breakpoints\" correspond to lower, intermediate and high  risk states for future coronary events.  Such information should be  used, along with appropriate clinical judgment, to make decisions  regarding the intensity of risk factor management strategies to treat  blood lipids and to modify other non-lipid coronary risk factors.    MRI 9/11/24  FINDINGS:  Parenchyma: There is no diffusion restriction abnormality to suggest  acute infarct.  No evidence of recent hemorrhage. There is no mass  effect or midline shift. No abnormal parenchymal enhancement. There  is moderate burden of small nonspecific T2/FLAIR white matter  hyperintensity scattered throughout the bilateral cerebral  hemispheres as well as involving the bilateral basal ganglia, likely  representing chronic small vessel ischemic disease and tiny remote  lacunar infarcts.      CSF Spaces: The ventricles, sulci and basal cisterns are within  normal limits for age with mild-to-moderate generalized brain  atrophy. Basilar cisterns are patent.      Extra-axial spaces: No extra-axial fluid collection.      Intracranial Flow Voids: Patent appearing.      Paranasal Sinuses: Opacification of the majority of the " right  sphenoid sinus with marked mucosal thickening. Moderate diffuse  mucosal thickening of the ethmoidal air cells bilaterally. There is  also scattered mucosal thickening of the bilateral maxillary  paranasal sinuses.      Mastoids: Small amount of fluid within the mastoids bilaterally.      IAC region: There is no focus of abnormal enhancement or mass effect  in bilateral cerebellopontine angle cisterns. There is no focus of  abnormal enhancement within bilateral internal auditory canals.  Bilateral inner ear structures demonstrate expected signal and  postcontrast appearance.      Orbits: Normal.      Calvarium: No suspicious osseous marrow signal.      IMPRESSION:  No acute infarct, recent hemorrhage, intracranial mass effect, or  abnormal enhancement. Unremarkable MR appearance of the internal  auditory canals and inner ear structures.      Moderate chronic small vessel ischemic disease with tiny remote  bilateral basal ganglia lacunar infarcts suggested. Mild-to-moderate  generalized brain atrophy.      Fluid within the bilateral mastoids.      No CT head results found for the past 12 months  No MRI head results found for the past 12 months  No results found for this or any previous visit (from the past 4464 hours).  No echocardiogram results found for the past 12 months     Lab Results   Component Value Date    CHOL 138 07/24/2024    TRIG 62 07/24/2024    HDL 64.7 07/24/2024    CHHDL 2.1 07/24/2024    LDLF 64 07/26/2022    VLDL 12 07/24/2024    NHDL 73 07/24/2024     Lab Results   Component Value Date    HGBA1C 5.1 09/05/2024     Lab Results   Component Value Date    GLUCOSE 101 (H) 07/24/2024     07/24/2024    K 4.5 07/24/2024     07/24/2024    CO2 27 07/24/2024    ANIONGAP 14 07/24/2024    BUN 8 07/24/2024    CREATININE 0.46 (L) 07/24/2024    CALCIUM 9.7 07/24/2024    ALBUMIN 4.1 07/24/2024     Lab Results   Component Value Date    CALCIUM 9.7 07/24/2024    PROT 6.6 07/24/2024    ALBUMIN 4.1  "07/24/2024    AST 20 07/24/2024    ALT 19 07/24/2024    ALKPHOS 59 07/24/2024    BILITOT 0.5 07/24/2024     Lab Results   Component Value Date    WBC 7.6 07/24/2024    HGB 12.7 07/24/2024    HCT 39.7 07/24/2024    MCV 95 07/24/2024     (H) 09/05/2024   No results found for: \"INR\"  Lab Results   Component Value Date    TSH 2.19 05/20/2022       Relevant ROS, Problem list, Past Medical/ Surgical/ Family/ Social history- reviewed and pertinent details noted in history.     Objective     Visit Vitals  /72   Pulse 94   Ht 1.651 m (5' 5\")   Wt 46.7 kg (103 lb)   BMI 17.14 kg/m²   OB Status Hysterectomy   Smoking Status Never   BSA 1.46 m²       Neuro Scores/ Scales:       Barthel Index  Feeding: independent  Bathing: independent (or in shower)  Grooming: independent face/hair/teeth.shaving (implements provided)  Dressing : independent (including buttons, zips, laces,etc.)  Bowels: independent (including buttons, zips, laces, etc.)  Bladder: continent  Toilet Use : independent (on and off, dressing, wiping)  Transfers (Bed to chair and back) : independent  Mobility (On level surfaces): independent (but may use any aid for example, stick) > 50 yards  Stairs: independent  Total (0-100): 100   NIHSS: NIH Stroke Scale 1A. Level of Consciousness: Alert, Keenly Responsive, 1B. Ask Month and Age: Both Questions Right, 1C. Blink Eyes & Squeeze Hands: Performs Both Tasks, 2. Best Gaze: Normal, 3. Visual: No Visual Loss, 4. Facial Palsy: Normal Symmetrical Movements, 5A. Motor - Left Arm: No Drift, 5B. Motor - Right Arm: No Drift, 6A. Motor - Left Leg: No Drift, 6B. Motor - Right Leg: No Drift, 7. Limb Ataxia: Absent, 8. Sensory Loss: Normal, 9. Best Language: No Aphasia, 10. Dysarthria: Normal, 11. Extinction and Inattention: No Abnormality, NIH Stroke Scale: 0        Assessment/Plan   1. Abnormal finding on MRI of brain        Ev Loja is a 75 yo F with PMHx HTN, sensorineural hearing loss, GERD, polycythemia, IgG " "deficiency, asthma, insomnia and RENE who presents for evaluation after MRI revealed cerebrovascular disease. She received an MRI IAC after ENT evaluation revealed asymmetric sensoriuneural hearing loss which revealed moderate chronic small vessel ischemic disease, multiple remote lacunar infarcts of bilateral basal ganglia, and mild-moderate generalized atrophy. She denies any history of associated neurologic symptoms. Neurologic examination today was unrevealing. We discussed stroke prevention strategies. RENE is main risk factor which is treated with CPAP currently.     PLAN    Goals for STROKE PREVENTION- recommendations to reduce the risk of vascular events and promote brain health include monitoring and management of vascular risk factors and lifestyle choices to goal values.     Cardiovascular disease- Goal is monitoring and management of conditions that increase stroke risk.    Antithrombotic \"blood thinner\" medication- Antithrombic Therapy/Blood Thinners : At goal  Blood pressure- Normal BP is <120/80 mmHg.  Blood Pressure : At goal, continue current plan  Cholesterol- Ideal lipid profile is an LDL-cholesterol <70 mg/dl, total cholesterol <200 mg/dl, fasting triglycerides < 150 mg/dl and HDL-cholesterol >55 mg/dl.   Blood sugar- Blood Sugar : At goal, continue current plan  Healthy physical activity- Goal is a moderate level of exercise at least 30 minutes/day, most days of the week.   Healthy weight- Goal is an ideal weight with a waistline <40\" for men or <35\" for women, and BMI of 18.5-25.   Healthy diet- is rich in vegetables, fruits, whole grains, legumes and fish, low in salt, and avoids red meats and processed/ refined foods.   Healthy sleep- is restorative, ~7 hours/night, with identification and treatment of obstructive/ central sleep apnea that increases the risk of stroke and heart disease.   Stroke Warning Signs- know the symptoms of stroke and the importance of calling 911/EMS to access the " quickest treatment.     Followup as needed.     No orders of the defined types were placed in this encounter.      Signed:  Gilda Beatty MD  PGY-5 Behavioral Neurology and Neuropsychiatry  11/04/24 2:13 PM

## 2024-11-04 NOTE — PATIENT INSTRUCTIONS
"Goals for STROKE PREVENTION- recommendations to reduce the risk of vascular events and promote brain health include monitoring and management of vascular risk factors and lifestyle choices to goal values.     Cardiovascular disease- Goal is monitoring and management of conditions that increase stroke risk.    Antithrombotic \"blood thinner\" medication- Antithrombic Therapy/Blood Thinners : At goal  Blood pressure- Normal BP is <120/80 mmHg.  Blood Pressure : At goal, continue current plan  Cholesterol- Ideal lipid profile is an LDL-cholesterol <70 mg/dl, total cholesterol <200 mg/dl, fasting triglycerides < 150 mg/dl and HDL-cholesterol >55 mg/dl.   Blood sugar- Blood Sugar : At goal, continue current plan  Healthy physical activity- Goal is a moderate level of exercise at least 30 minutes/day, most days of the week.   Healthy weight- Goal is an ideal weight with a waistline <40\" for men or <35\" for women, and BMI of 18.5-25.   Healthy diet- is rich in vegetables, fruits, whole grains, legumes and fish, low in salt, and avoids red meats and processed/ refined foods.   Healthy sleep- is restorative, ~7 hours/night, with identification and treatment of obstructive/ central sleep apnea that increases the risk of stroke and heart disease.   Stroke Warning Signs- know the symptoms of stroke and the importance of calling 911/EMS to access the quickest treatment.     Followup as needed.   "

## 2024-11-13 ENCOUNTER — APPOINTMENT (OUTPATIENT)
Dept: SLEEP MEDICINE | Facility: CLINIC | Age: 74
End: 2024-11-13
Payer: MEDICARE

## 2025-01-05 DIAGNOSIS — J45.40 MODERATE PERSISTENT ASTHMA WITHOUT COMPLICATION (HHS-HCC): ICD-10-CM

## 2025-01-06 RX ORDER — FLUTICASONE FUROATE AND VILANTEROL TRIFENATATE 100; 25 UG/1; UG/1
1 POWDER RESPIRATORY (INHALATION) DAILY
Qty: 60 EACH | Refills: 0 | Status: SHIPPED | OUTPATIENT
Start: 2025-01-06

## 2025-01-14 ENCOUNTER — APPOINTMENT (OUTPATIENT)
Dept: DERMATOLOGY | Facility: CLINIC | Age: 75
End: 2025-01-14
Payer: MEDICARE

## 2025-01-27 DIAGNOSIS — J45.40 MODERATE PERSISTENT ASTHMA WITHOUT COMPLICATION (HHS-HCC): ICD-10-CM

## 2025-01-27 RX ORDER — FLUTICASONE FUROATE AND VILANTEROL TRIFENATATE 100; 25 UG/1; UG/1
1 POWDER RESPIRATORY (INHALATION) DAILY
Qty: 60 EACH | Refills: 2 | Status: SHIPPED | OUTPATIENT
Start: 2025-01-27

## 2025-02-06 DIAGNOSIS — K21.9 GASTROESOPHAGEAL REFLUX DISEASE WITHOUT ESOPHAGITIS: ICD-10-CM

## 2025-02-06 RX ORDER — FAMOTIDINE 40 MG/1
40 TABLET, FILM COATED ORAL NIGHTLY
Qty: 90 TABLET | Refills: 1 | Status: SHIPPED | OUTPATIENT
Start: 2025-02-06

## 2025-04-14 DIAGNOSIS — I10 ESSENTIAL (PRIMARY) HYPERTENSION: ICD-10-CM

## 2025-04-14 RX ORDER — LISINOPRIL 5 MG/1
5 TABLET ORAL DAILY
Qty: 90 TABLET | Refills: 0 | Status: SHIPPED | OUTPATIENT
Start: 2025-04-14

## 2025-05-01 ENCOUNTER — OFFICE VISIT (OUTPATIENT)
Dept: PRIMARY CARE | Facility: CLINIC | Age: 75
End: 2025-05-01
Payer: MEDICARE

## 2025-05-01 VITALS
HEART RATE: 89 BPM | HEIGHT: 65 IN | WEIGHT: 98.6 LBS | OXYGEN SATURATION: 98 % | TEMPERATURE: 97.2 F | DIASTOLIC BLOOD PRESSURE: 62 MMHG | BODY MASS INDEX: 16.43 KG/M2 | SYSTOLIC BLOOD PRESSURE: 118 MMHG

## 2025-05-01 DIAGNOSIS — H65.03 NON-RECURRENT ACUTE SEROUS OTITIS MEDIA OF BOTH EARS: Primary | ICD-10-CM

## 2025-05-01 DIAGNOSIS — D80.3 IGG SUBCLASS DEFICIENCY (MULTI): ICD-10-CM

## 2025-05-01 DIAGNOSIS — R26.89 IMBALANCE: ICD-10-CM

## 2025-05-01 PROCEDURE — 1159F MED LIST DOCD IN RCRD: CPT | Performed by: FAMILY MEDICINE

## 2025-05-01 PROCEDURE — 3074F SYST BP LT 130 MM HG: CPT | Performed by: FAMILY MEDICINE

## 2025-05-01 PROCEDURE — 3078F DIAST BP <80 MM HG: CPT | Performed by: FAMILY MEDICINE

## 2025-05-01 PROCEDURE — 1160F RVW MEDS BY RX/DR IN RCRD: CPT | Performed by: FAMILY MEDICINE

## 2025-05-01 PROCEDURE — 1123F ACP DISCUSS/DSCN MKR DOCD: CPT | Performed by: FAMILY MEDICINE

## 2025-05-01 PROCEDURE — 99213 OFFICE O/P EST LOW 20 MIN: CPT | Performed by: FAMILY MEDICINE

## 2025-05-01 ASSESSMENT — ENCOUNTER SYMPTOMS
OCCASIONAL FEELINGS OF UNSTEADINESS: 0
DEPRESSION: 0
LOSS OF SENSATION IN FEET: 0

## 2025-05-01 NOTE — PROGRESS NOTES
"Subjective   Patient ID: Ev Loja is a 75 y.o. female who presents for Sick Visit.  Patient presents with complaints of not feeling well.  Over the past month, she has felt dizzy off and on.  She describes it as an off balance feeling.  Not spinning.  Sometimes with walking feels like off balance.    For the past 3-4 days, she has had hoarseness with talking, no ST.  No sinus congestion or runny nose.  Energy is up and down.  Up until a week ago, energy was great.  No chest congestion or cough.  Sleep has been disrupted recently.  No ear pain.  Has tinnitus, not new    No unusual headaches.  No tremor.  Intermittently notices strength is not as good as usual.  No visual disturbance.        Review of Systems    Objective   /62 (BP Location: Right arm, Patient Position: Sitting, BP Cuff Size: Adult)   Pulse 89   Temp 36.2 °C (97.2 °F) (Temporal)   Ht 1.651 m (5' 5\")   Wt (!) 44.7 kg (98 lb 9.6 oz)   SpO2 98%   BMI 16.41 kg/m²     Physical Exam  Vitals reviewed.   Constitutional:       Appearance: Normal appearance.   HENT:      Right Ear: Ear canal normal. A middle ear effusion is present.      Left Ear: Ear canal normal. A middle ear effusion is present.      Nose: Rhinorrhea present. No congestion.      Mouth/Throat:      Pharynx: Posterior oropharyngeal erythema present. No oropharyngeal exudate.   Eyes:      Extraocular Movements: Extraocular movements intact.      Conjunctiva/sclera: Conjunctivae normal.      Pupils: Pupils are equal, round, and reactive to light.   Cardiovascular:      Rate and Rhythm: Normal rate and regular rhythm.      Heart sounds: No murmur heard.  Pulmonary:      Effort: Pulmonary effort is normal.      Breath sounds: Normal breath sounds.   Musculoskeletal:      Cervical back: Normal range of motion and neck supple.      Right lower leg: No edema.      Left lower leg: No edema.   Lymphadenopathy:      Cervical: No cervical adenopathy.   Neurological:      Mental Status: She " is alert and oriented to person, place, and time.      Motor: No weakness.      Coordination: Coordination normal.      Gait: Gait normal.      Deep Tendon Reflexes: Reflexes normal.   Psychiatric:         Mood and Affect: Mood normal.         Behavior: Behavior normal.           Assessment/Plan   Problem List Items Addressed This Visit       IgG subclass deficiency (Multi)     Other Visit Diagnoses         Non-recurrent acute serous otitis media of both ears    -  Primary      Imbalance        Relevant Orders    Referral to Neurology

## 2025-05-02 NOTE — PATIENT INSTRUCTIONS
Intermittent feelings of imbalance, possibly related to fluid behind eardrums.  Can use Flonase nasal spray daily to try and help that resolve, as well as allegra or Claritin.  Will also refer to neurology for evaluation of other possibilities, due to length of symptoms, along with intermittent weakness.  Reach out if any worsening or new symptoms in meantime.

## 2025-05-14 DIAGNOSIS — J45.40 MODERATE PERSISTENT ASTHMA WITHOUT COMPLICATION (HHS-HCC): ICD-10-CM

## 2025-05-15 RX ORDER — FLUTICASONE FUROATE AND VILANTEROL TRIFENATATE 100; 25 UG/1; UG/1
1 POWDER RESPIRATORY (INHALATION) DAILY
Qty: 60 EACH | Refills: 2 | Status: SHIPPED | OUTPATIENT
Start: 2025-05-15

## 2025-06-23 ENCOUNTER — APPOINTMENT (OUTPATIENT)
Dept: NEUROLOGY | Facility: CLINIC | Age: 75
End: 2025-06-23
Payer: MEDICARE

## 2025-06-23 VITALS
SYSTOLIC BLOOD PRESSURE: 105 MMHG | WEIGHT: 98 LBS | BODY MASS INDEX: 16.31 KG/M2 | DIASTOLIC BLOOD PRESSURE: 64 MMHG | HEART RATE: 79 BPM | RESPIRATION RATE: 18 BRPM

## 2025-06-23 DIAGNOSIS — R42 DIZZINESS: Primary | ICD-10-CM

## 2025-06-23 PROCEDURE — 1160F RVW MEDS BY RX/DR IN RCRD: CPT | Performed by: PSYCHIATRY & NEUROLOGY

## 2025-06-23 PROCEDURE — 99214 OFFICE O/P EST MOD 30 MIN: CPT | Performed by: PSYCHIATRY & NEUROLOGY

## 2025-06-23 PROCEDURE — 3078F DIAST BP <80 MM HG: CPT | Performed by: PSYCHIATRY & NEUROLOGY

## 2025-06-23 PROCEDURE — 1126F AMNT PAIN NOTED NONE PRSNT: CPT | Performed by: PSYCHIATRY & NEUROLOGY

## 2025-06-23 PROCEDURE — 3074F SYST BP LT 130 MM HG: CPT | Performed by: PSYCHIATRY & NEUROLOGY

## 2025-06-23 PROCEDURE — 1036F TOBACCO NON-USER: CPT | Performed by: PSYCHIATRY & NEUROLOGY

## 2025-06-23 PROCEDURE — 1159F MED LIST DOCD IN RCRD: CPT | Performed by: PSYCHIATRY & NEUROLOGY

## 2025-06-23 ASSESSMENT — PAIN SCALES - GENERAL: PAINLEVEL_OUTOF10: 0-NO PAIN

## 2025-06-23 NOTE — PROGRESS NOTES
"   [facility]   Ev Loja is a 75 y.o. year old female presenting for worsening symptoms .   Referred by: PCP: Sasha Ferguson MD    11/2024- Consult for abnormal MRI from Dr Ferguson  MRI 10/1/24 (for hearing loss)- showed patchy subcortical microvascular changes and volume loss.  No GE changes or enhancement.    Vascular risk factors- RENE, HTN- well controlled at office visits on no medications.   No HPL LDL in 60s, No DM A1c 5.1%, nonsmoker.   Reviewed strategies to  promote brain health.     06/23/25- Re-consult for dizziness, imbalance since April.  PCP dx serous effusions bilaterally and referred back to Neurology.   Ongoing symptoms difficult to describe. Associated with neck stiffness along sides and into trapezius, has this on awakening thinks it may be a sleeping pattern with CPAP. Can last for a minute or up to hours, \"just feel off... like I have to be careful\".  No palpitations.  Not spinning, not positional and movements will help.      Extensive review of notes in EMR, labs, tests, Interpretation of neuroimaging  As outlined     Lab Results   Component Value Date    CHOL 138 07/24/2024    TRIG 62 07/24/2024    HDL 64.7 07/24/2024    CHHDL 2.1 07/24/2024    LDLF 64 07/26/2022    VLDL 12 07/24/2024    NHDL 73 07/24/2024     Lab Results   Component Value Date    HGBA1C 5.1 09/05/2024     Lab Results   Component Value Date    GLUCOSE 101 (H) 07/24/2024     07/24/2024    K 4.5 07/24/2024     07/24/2024    CO2 27 07/24/2024    ANIONGAP 14 07/24/2024    BUN 8 07/24/2024    CREATININE 0.46 (L) 07/24/2024    CALCIUM 9.7 07/24/2024    ALBUMIN 4.1 07/24/2024     Lab Results   Component Value Date    CALCIUM 9.7 07/24/2024    PROT 6.6 07/24/2024    ALBUMIN 4.1 07/24/2024    AST 20 07/24/2024    ALT 19 07/24/2024    ALKPHOS 59 07/24/2024    BILITOT 0.5 07/24/2024     Lab Results   Component Value Date    WBC 7.6 07/24/2024    HGB 12.7 07/24/2024    HCT 39.7 " "07/24/2024    MCV 95 07/24/2024     (H) 09/05/2024   No results found for: \"INR\"  Lab Results   Component Value Date    TSH 2.19 05/20/2022       Relevant ROS, Problem list, Past Medical/ Surgical/ Family/ Social history- reviewed and pertinent details noted in history.     Objective     Visit Vitals  /64 (BP Location: Right arm, Patient Position: Sitting, BP Cuff Size: Child)   Pulse 79   Resp 18   Wt (!) 44.5 kg (98 lb)   BMI 16.31 kg/m²   OB Status Hysterectomy   Smoking Status Never   BSA 1.43 m²       Physical Exam: General appearance: no acute distress.  No symptoms today.   Neurological Exam:    Mental status: The patient was alert, interactive and cooperative with an appropriate affect.  Speech is clear.  Language intact for comprehension, expression, and vocabulary.    Cranial Nerves- Visual fields full to confrontation. No ptosis, pupils reactive. Ocular movements full without nystagmus. Shoulder shrug is 5/5.   Cervical ROM is full with mild thoracic kyphosis. No abnormal or adventitious movements were noted.    Muscle bulk is thin but symmetric and strength is 5/5.  Hand dexterity is normal.    Coordination testing shows no limb dystaxia.   Stance is stable with a negative Romberg.  Straightaway gait is normal without spasticity or bradykinesia.      Assessment/Plan   1. Dizziness    Cervicogenic pattern suggested given associated cervical myofascial pain and improvement when she does Jose Manuel Chi. .  Recommend vestibular PT program       Orders Placed This Encounter   Procedures    Referral to Physical Therapy                       "

## 2025-06-23 NOTE — PATIENT INSTRUCTIONS
1. Dizziness    Cervicogenic pattern suggested given associated cervical myofascial pain and improvement when she does Jose Manuel Chi. .  Recommend vestibular PT program

## 2025-07-14 DIAGNOSIS — I10 ESSENTIAL (PRIMARY) HYPERTENSION: ICD-10-CM

## 2025-07-14 RX ORDER — LISINOPRIL 5 MG/1
5 TABLET ORAL DAILY
Qty: 90 TABLET | Refills: 0 | Status: SHIPPED | OUTPATIENT
Start: 2025-07-14

## 2025-07-15 DIAGNOSIS — J45.40 MODERATE PERSISTENT ASTHMA WITHOUT COMPLICATION (HHS-HCC): ICD-10-CM

## 2025-07-15 NOTE — PROGRESS NOTES
Ev Loja is a 75 y.o. female with past medical history of HTN, asthma, sleep apnea, and basal cell carcinoma who presents today for abdominal pain. She reports a *** history of ***. Associated with ***. Worse with ***.     Has been trying to wean Pepcid due to being on this for years.    She has tried *** with *** improvement.    She does not take NSAIDS regularly.     EGD or colonoscopy 2019 with esophageal candidiasis. Colon normal. Path unavailable.    Social history: -    Family history: Denies family history of colon cancer or other GI disorders or malignancy.     Medical History[1]  Surgical History[2]  Current Medications[3]      Review of Systems  Review of Systems negative except as noted in HPI.    Objective     There were no vitals taken for this visit.     Physical Exam  Constitutional:  No acute distress. Normal appearance. Not ill-appearing.  HENT:  Head normocephalic and atraumatic. Conjunctivae normal.  Cardiovascular:  Normal rate. Regular rhythm.  Pulmonary:  Pulmonary effort normal. No respiratory distress. Breath sounds clear.  Abdominal:  Abdomen is flat and soft. There is no distension. No tenderness or guarding.  Skin: Dry.  Neurological:  Alert and oriented.  Psychiatric:  Mood and affect normal.    Assessment/Plan     75 y.o. female with history of *** who presents today for clinic visit for *** history of ***.    Recommendations  1.  2. Follow up    Electronically signed by: Nicole Rain CNP on 7/15/2025 at 2:32 PM           [1]   Past Medical History:  Diagnosis Date    Basal cell carcinoma     Personal history of other diseases of the circulatory system 02/27/2018    History of hypertension    Personal history of other diseases of the respiratory system 02/27/2018    History of asthma   [2]   Past Surgical History:  Procedure Laterality Date    HYSTERECTOMY  2004    OTHER SURGICAL HISTORY  07/13/2021    Hysterectomy total abdominal with removal of both ovaries    SINUS SURGERY   02/27/2018    Sinus Surgery   [3]   Current Outpatient Medications   Medication Sig Dispense Refill    Breo Ellipta 100-25 mcg/dose inhaler Inhale 1 puff once daily. 60 each 2    calcium-vits G8-V-U9-minerals (Bone Essentials) 166.75 mg- 166.75 unit capsule Take by mouth.      fluticasone (Children's Flonase Allergy Rlf) 50 mcg/actuation nasal spray Administer into affected nostril(s).      lisinopril 5 mg tablet Take 1 tablet (5 mg) by mouth once daily. 90 tablet 0     No current facility-administered medications for this visit.

## 2025-07-16 RX ORDER — FLUTICASONE FUROATE AND VILANTEROL TRIFENATATE 100; 25 UG/1; UG/1
1 POWDER RESPIRATORY (INHALATION) DAILY
Qty: 60 EACH | Refills: 2 | Status: SHIPPED | OUTPATIENT
Start: 2025-07-16

## 2025-07-17 ENCOUNTER — APPOINTMENT (OUTPATIENT)
Dept: GASTROENTEROLOGY | Facility: CLINIC | Age: 75
End: 2025-07-17
Payer: MEDICARE

## 2025-07-17 NOTE — PROGRESS NOTES
Physical Therapy    Physical Therapy Evaluation and Treatment      Patient Name: Ev Loja  MRN: [id number]  Today's Date:  7/23/2025     Time Entry:   Time Calculation  Start Time: 1530  Stop Time: 1645  Time Calculation (min): 75 min  PT Evaluation Time Entry  PT Evaluation (Low) Time Entry: 25  PT Therapeutic Procedures Time Entry  Therapeutic Exercise Time Entry: 45                 Visit number:  1   Insurance:  Medicare  POC:  7/23/2025-10/21/2025  Primary diagnosis:  Impaired gait and mobility    Assessment:  PT Assessment  PT Assessment Results: Decreased strength, Decreased range of motion, Impaired balance, Decreased mobility, Pain  Rehab Prognosis: Good     Patient presents with a diagnosis of dizzy with associated decreased ROM, strength, balance, increased fall risk and decreased knowledge of how to manage symptoms independently, difficulty with ambulation, functional tasks including any task when standing or walking and overall decreased quality of life.  The patient can benefit from skilled therapy interventions to address the patient's deficits and maximize the quality of her life.     Plan:  OP PT Plan  Treatment/Interventions: Dry needling, Education/ Instruction, Manual therapy, Gait training, Neuromuscular re-education, Therapeutic activities, Therapeutic exercises  PT Plan: Skilled PT  PT Frequency: 1 time per week  Duration: 12 weeks  Onset Date: 06/23/25  Certification Period Start Date: 07/23/25  Certification Period End Date: 10/21/25  Number of Treatments Authorized: 20 per year Medicare  Rehab Potential: Good  Plan of Care Agreement: Patient    Per patient request her care will be transferred to [place] with her primary physical therapist being Farhan Dietz.    Current Problem:   Problem List Items Addressed This Visit    None  Visit Diagnoses         Codes      Impaired gait and mobility    -  Primary R26.89    Relevant Orders    Follow Up In Physical Therapy      Weakness of both  "hips     R29.898    Relevant Orders    Follow Up In Physical Therapy      Ankle joint stiffness, bilateral     M25.671, M25.672    Relevant Orders    Follow Up In Physical Therapy      Poor posture     R29.3    Relevant Orders    Follow Up In Physical Therapy           Subjective    Patient reports that the symptom usually starts in the AM.  If she wakes up with them they are there all day until she alfaro her Jose Manuel Chi.   Worse:  no known causes  Better with Jose Manuel Chi    Neck pain(across shoulder) 0-4/10 in last week, currently 0/10.  Worse in AM.  Jose Manuel Chi makes pain decrease.     Sleeping posture left side with pillow and cervical roll for accommodation for C-Pap.  Wakes with neck pain and stiffness.    General:  General  Preferred Learning Style: auditory, kinesthetic, verbal, visual, written    PER VICKI MADRID NOTE DATED 6/23/2025:  Ev Loja is a 75 y.o. year old female presenting for worsening symptoms .   Referred by: PCP: Sasha Ferguson MD     11/2024- Consult for abnormal MRI from Dr Ferguson  MRI 10/1/24 (for hearing loss)- showed patchy subcortical microvascular changes and volume loss.  No GE changes or enhancement.    Vascular risk factors- RENE, HTN- well controlled at office visits on no medications.   No HPL LDL in 60s, No DM A1c 5.1%, nonsmoker.   Reviewed strategies to  promote brain health.      06/23/25- Re-consult for dizziness, imbalance since April.  PCP dx serous effusions bilaterally and referred back to Neurology.   Ongoing symptoms difficult to describe. Associated with neck stiffness along sides and into trapezius, has this on awakening thinks it may be a sleeping pattern with CPAP. Can last for a minute or up to hours, \"just feel off... like I have to be careful\".  No palpitations.  Not spinning, not positional and movements will help.      Precautions:  Precautions  STEADI Fall Risk Score (The score of 4 or more indicates an increased risk of falling): 0    Vital Signs:     None taken this " date    Pain:  Pain Assessment  Pain Assessment: 0-10  0-10 (Numeric) Pain Score:  (0-4/10 across shoulders in back)  Home Living:     Multi up stair bedbath, bath on first and second bath, basement laundry    Prior Level of Function:     Independent without dizziness.    Objective   CERVICAL ROM:    AROM   EXTENSION 40   FLEXION 50   RIGHT ROTATION 60   LEFT ROTATION 55   RIGHT LATERAL FLEXION 15   LEFT LATERAL FLEXION 20     CERVICAL SPECIAL TEST:  TRANSVERSE LIGAMENT  NEG   SHARP-REGINE NEG    VERTEBRAL ARTERY SUBJECTIVE REPORTING FOR THE FOLLOWING:     NEGATIVE POSITIVE   DYPLOPIA X    DYSARTHRIA X    DYSPHAGIA X    NYSTAGMUS X    DROP ATTACK X    NAUSEA X    FACIAL NUMBNESS X    DIZZINESS X      LOWER EXTREMITY ROM:  Ankle DF to 5 degrees    LOWER EXTREMITY STRENGTH:  HIP STRENGTH      RIGHT   LEFT   EXTENSION 4-/5 4-/5   FLEXION 4-/5 4-/5   ABDUCTION 4-/5 4-/5   ADDUCTION 4/5 4/5     KNEE STRENGTH      RIGHT   LEFT   EXTENSION     5/5         5/5    FLEXION     5/5     5/5      ANKLE STRENGTH      RIGHT   LEFT   DORSIFLEXION 4+/5 4+/5   PLANTAR FLEXION 4+/5 4+/5   INVERSION 4+/5 4+/5   EVERSION 4+/5 4+/5       OCULAR MOTOR EXAM:   NORMAL ABNORMAL   RANGE OF MOTION X    SMOOTH PURSUIT  X    HORIZONTAL SACCADES X    VERTICAL SACCADES X    EYE ALIGNMENT X    COVER X    UNCOVER X    CROSS COVER X    CONVERGENCE X      STATIC BALANCE:  HALF TANDEM:   EYES OPEN EYES CLOSED   SOLID 30 30   FOAM 30 30   Patient with excessive postural sway and at times to limit of stability    Dynamic BALANCE:  FGA: 25/30  Patient with atypical presentation with decreased performance with vision challenges with eyes open, but able to perform ambulation with EC with minimal path veer and slower molina.  But patient was able to walk 9 of 10 steps in tandem without difficulty.  Patient high score maybe in part due to her practicing Jose Manuel Chi daily.    Outcome Measures:  Other Measures  Dizziness Handicap Inventory: 12  "    Treatments:  Lateral cervical lateral flexion 3 times 30\" *   Levator scap 3 times 30\" *   Hip 4 planes green 10 times 1 set *  Patient may need further reinforcement due to complexity of exercise.    EDUCATION:  Outpatient Education  Individual(s) Educated: Patient  Education Provided: Home Exercise Program, POC  Risk and Benefits Discussed with Patient/Caregiver/Other: yes  Patient/Caregiver Demonstrated Understanding: yes  Plan of Care Discussed and Agreed Upon: yes  Patient Response to Education: Patient/Caregiver Verbalized Understanding of Information, Patient/Caregiver Performed Return Demonstration of Exercises/Activities, Patient/Caregiver Asked Appropriate Questions    Goals:  Active       PT Problem       PT Goal 1: Patient will increase ankle DF to 10 degrees to improve gait mechanics and increase ease in ankle strategies.         Start:  07/23/25    Expected End:  10/21/25            PT Goal 2:  Patient will increase hip Abduction strength to 4/5 to allow increased ease in community ambulation and stair negotiation.        Start:  07/23/25    Expected End:  10/21/25            PT Goal 3:  Patient will increase cervical rotation to 0-60 degrees to allow the patient to scan environment without pain.        Start:  07/23/25    Expected End:  10/21/25            PT Goal 4: Patient decrease neck pain to 0-2/10 to allow increased ease in scanning the environment.         Start:  07/23/25    Expected End:  10/21/25            PT Goal 5:  Patient will be independent in Home Exercise Program to manage symptoms and maximize their functional independence.        Start:  07/23/25    Expected End:  10/21/25            Patient Stated Goal 1:  Feel more balanced as I walk.       Start:  07/23/25    Expected End:  10/21/25                      "

## 2025-07-22 ENCOUNTER — APPOINTMENT (OUTPATIENT)
Dept: PRIMARY CARE | Facility: CLINIC | Age: 75
End: 2025-07-22
Payer: MEDICARE

## 2025-07-22 VITALS
DIASTOLIC BLOOD PRESSURE: 60 MMHG | TEMPERATURE: 96.8 F | HEIGHT: 65 IN | SYSTOLIC BLOOD PRESSURE: 116 MMHG | HEART RATE: 80 BPM | OXYGEN SATURATION: 96 % | BODY MASS INDEX: 16.53 KG/M2 | WEIGHT: 99.2 LBS

## 2025-07-22 DIAGNOSIS — Z13.1 SCREENING FOR DIABETES MELLITUS (DM): ICD-10-CM

## 2025-07-22 DIAGNOSIS — Z12.31 BREAST CANCER SCREENING BY MAMMOGRAM: ICD-10-CM

## 2025-07-22 DIAGNOSIS — Z00.00 ROUTINE GENERAL MEDICAL EXAMINATION AT HEALTH CARE FACILITY: Primary | ICD-10-CM

## 2025-07-22 DIAGNOSIS — J45.40 MODERATE PERSISTENT ASTHMA WITHOUT COMPLICATION (HHS-HCC): ICD-10-CM

## 2025-07-22 DIAGNOSIS — E46 PROTEIN-CALORIE MALNUTRITION, UNSPECIFIED SEVERITY (MULTI): ICD-10-CM

## 2025-07-22 DIAGNOSIS — D75.1 POLYCYTHEMIA: ICD-10-CM

## 2025-07-22 DIAGNOSIS — R73.09 ELEVATED GLUCOSE: ICD-10-CM

## 2025-07-22 DIAGNOSIS — I10 BENIGN ESSENTIAL HYPERTENSION: ICD-10-CM

## 2025-07-22 PROBLEM — R39.9 UTI SYMPTOMS: Status: RESOLVED | Noted: 2023-09-07 | Resolved: 2025-07-22

## 2025-07-22 PROBLEM — N39.0 URINARY TRACT INFECTION, ACUTE: Status: RESOLVED | Noted: 2023-09-07 | Resolved: 2025-07-22

## 2025-07-22 PROCEDURE — 1160F RVW MEDS BY RX/DR IN RCRD: CPT | Performed by: FAMILY MEDICINE

## 2025-07-22 PROCEDURE — G0439 PPPS, SUBSEQ VISIT: HCPCS | Performed by: FAMILY MEDICINE

## 2025-07-22 PROCEDURE — 3074F SYST BP LT 130 MM HG: CPT | Performed by: FAMILY MEDICINE

## 2025-07-22 PROCEDURE — 1159F MED LIST DOCD IN RCRD: CPT | Performed by: FAMILY MEDICINE

## 2025-07-22 PROCEDURE — 1036F TOBACCO NON-USER: CPT | Performed by: FAMILY MEDICINE

## 2025-07-22 PROCEDURE — 1170F FXNL STATUS ASSESSED: CPT | Performed by: FAMILY MEDICINE

## 2025-07-22 PROCEDURE — 3078F DIAST BP <80 MM HG: CPT | Performed by: FAMILY MEDICINE

## 2025-07-22 ASSESSMENT — ACTIVITIES OF DAILY LIVING (ADL)
BATHING: INDEPENDENT
TAKING_MEDICATION: INDEPENDENT
GROCERY_SHOPPING: INDEPENDENT
DRESSING: INDEPENDENT
MANAGING_FINANCES: INDEPENDENT
DOING_HOUSEWORK: INDEPENDENT

## 2025-07-22 ASSESSMENT — PATIENT HEALTH QUESTIONNAIRE - PHQ9
2. FEELING DOWN, DEPRESSED OR HOPELESS: NOT AT ALL
1. LITTLE INTEREST OR PLEASURE IN DOING THINGS: NOT AT ALL
SUM OF ALL RESPONSES TO PHQ9 QUESTIONS 1 AND 2: 0

## 2025-07-22 ASSESSMENT — ENCOUNTER SYMPTOMS
DEPRESSION: 0
LOSS OF SENSATION IN FEET: 0
OCCASIONAL FEELINGS OF UNSTEADINESS: 0

## 2025-07-22 NOTE — PATIENT INSTRUCTIONS
Immunizations recommended:  --Flu shot every fall.  --Pneumonia shots have been done.  --Shingrix--has been done.  --Tetanus every 10 years.--yours was done in 2022.  --RSV has been done.  --Covid vaccine per recommendations     Pap not needed.     Colonoscopy should be done every 10 years after the age of 45, unless there was a previous abnormality, or family history of colon cancer.  Yours was done in 2019, could be repeated after 10 years, but at that point, benefit may not outweigh the risk.     Mammogram screening recommendations are changing, but at this time, I recommend a mammogram every 1-2 years in your 40's, and yearly after the age of 50. Having a family history of breast cancer may change that.  Due after 8/14/25     You should try to get 150 minutes of exercise weekly.  Be sure to eat a diet rich in fruits and vegetables, and low in saturated fats and sodium.    Make sure to wear sun screen when outside, and check for changing or unusual moles.     Pre-menopause recommendations are for 1000 mg calcium and 1000 units vitamin D3 daily. After menopause calcium recommendation is 1200 mg, with 1000 units of vitamin D3  Bone density is due to evaluate for osteoporosis. (Declined)     I recommend you get a Living Will and Durable Power of  for Healthcare. These documents state what care you would want if you couldn't speak for yourself. They help your loved ones in caring for you if that happens.   Once you have those forms completed, you can keep a copy on file with your doctor.     Specialists being seen:  Dr. Tracy, sleep medicine  Dr. Ladd, allergist.  Dermatologist as needed.     BP controlled on 5 mg lisinopril.  Check fasting blood work today     For asthma, continue Breo.     For sleep apnea, CPAP working well.

## 2025-07-22 NOTE — ASSESSMENT & PLAN NOTE
Orders:    Comprehensive Metabolic Panel    Lipid Panel    TSH with reflex to Free T4 if abnormal

## 2025-07-22 NOTE — PROGRESS NOTES
"Subjective   Reason for Visit: Ev Loja is an 75 y.o. female here for a Medicare Wellness visit.     Past Medical, Surgical, and Family History reviewed and updated in chart.    Reviewed all medications by prescribing practitioner or clinical pharmacist (such as prescriptions, OTCs, herbal therapies and supplements) and documented in the medical record.    Patient has been doing well.  Breathing has been good.  Continues on Breo    Had hearing test.  Has to follow up with ENT due to one side being much different than the other.  Did follow up with neurologist, and had MRI.  It was OK.  Will be doing vestibular PT, since had vertigo in the past (better now).    Not checking BP at home.  No side effects to lisinopril.    Weaned off of famotidine and not problems.    Doesn't sleep well.  Will wake up about 3 nights a week, and often has trouble getting back to sleep.  CPAP is good.  Energy is better since using it.    Exercise regularly, a variety of things, including weights, stretching, cardio.  Eats healthy.  Energy is good.  Weight is stable.        Patient Care Team:  Sasha Ferguson MD as PCP - General  Sasha Ferguson MD as PCP - [facility]ributed Provider     Review of Systems    Objective   Vitals:  /60 (BP Location: Right arm, Patient Position: Sitting, BP Cuff Size: Adult)   Pulse 80   Temp 36 °C (96.8 °F) (Temporal)   Ht 1.651 m (5' 5\")   Wt (!) 45 kg (99 lb 3.2 oz)   SpO2 96%   BMI 16.51 kg/m²       Physical Exam  Vitals reviewed.   Constitutional:       Appearance: Normal appearance.   Neck:      Thyroid: No thyromegaly.     Cardiovascular:      Rate and Rhythm: Normal rate and regular rhythm.      Heart sounds: No murmur heard.  Pulmonary:      Effort: Pulmonary effort is normal.      Breath sounds: Normal breath sounds.     Musculoskeletal:      Cervical back: Normal range of motion and neck supple.      Right lower leg: No edema.      Left lower leg: No edema.   Lymphadenopathy:      " Cervical: No cervical adenopathy.     Neurological:      Mental Status: She is alert.     Psychiatric:         Mood and Affect: Mood normal.         Behavior: Behavior normal.         Assessment & Plan  Routine general medical examination at health care facility    Orders:    1 Year Follow Up In [facility] - PCP - Wellness Exam; Future    Benign essential hypertension    Orders:    Comprehensive Metabolic Panel    Lipid Panel    TSH with reflex to Free T4 if abnormal    Elevated glucose         Protein-calorie malnutrition, unspecified severity (Multi)         Moderate persistent asthma without complication ([facility])         Polycythemia    Orders:    CBC    Screening for diabetes mellitus (DM)    Orders:    Hemoglobin A1C    Breast cancer screening by mammogram    Orders:    BI mammo bilateral screening tomosynthesis; Future

## 2025-07-23 ENCOUNTER — CLINICAL SUPPORT (OUTPATIENT)
Dept: PHYSICAL THERAPY | Facility: CLINIC | Age: 75
End: 2025-07-23
Payer: MEDICARE

## 2025-07-23 DIAGNOSIS — R29.898 WEAKNESS OF BOTH HIPS: ICD-10-CM

## 2025-07-23 DIAGNOSIS — R26.89 IMPAIRED GAIT AND MOBILITY: Primary | ICD-10-CM

## 2025-07-23 DIAGNOSIS — M25.672 ANKLE JOINT STIFFNESS, BILATERAL: ICD-10-CM

## 2025-07-23 DIAGNOSIS — M25.671 ANKLE JOINT STIFFNESS, BILATERAL: ICD-10-CM

## 2025-07-23 DIAGNOSIS — R29.3 POOR POSTURE: ICD-10-CM

## 2025-07-23 LAB
ALBUMIN SERPL-MCNC: 4.6 G/DL (ref 3.6–5.1)
ALP SERPL-CCNC: 53 U/L (ref 37–153)
ALT SERPL-CCNC: 20 U/L (ref 6–29)
ANION GAP SERPL CALCULATED.4IONS-SCNC: 6 MMOL/L (CALC) (ref 7–17)
AST SERPL-CCNC: 25 U/L (ref 10–35)
BILIRUB SERPL-MCNC: 0.3 MG/DL (ref 0.2–1.2)
BUN SERPL-MCNC: 13 MG/DL (ref 7–25)
CALCIUM SERPL-MCNC: 9.4 MG/DL (ref 8.6–10.4)
CHLORIDE SERPL-SCNC: 104 MMOL/L (ref 98–110)
CHOLEST SERPL-MCNC: 148 MG/DL
CHOLEST/HDLC SERPL: 1.9 (CALC)
CO2 SERPL-SCNC: 26 MMOL/L (ref 20–32)
CREAT SERPL-MCNC: 0.47 MG/DL (ref 0.6–1)
EGFRCR SERPLBLD CKD-EPI 2021: 99 ML/MIN/1.73M2
ERYTHROCYTE [DISTWIDTH] IN BLOOD BY AUTOMATED COUNT: 12.5 % (ref 11–15)
EST. AVERAGE GLUCOSE BLD GHB EST-MCNC: 103 MG/DL
EST. AVERAGE GLUCOSE BLD GHB EST-SCNC: 5.7 MMOL/L
GLUCOSE SERPL-MCNC: 110 MG/DL (ref 65–99)
HBA1C MFR BLD: 5.2 %
HCT VFR BLD AUTO: 39.8 % (ref 35–45)
HDLC SERPL-MCNC: 76 MG/DL
HGB BLD-MCNC: 13.2 G/DL (ref 11.7–15.5)
LDLC SERPL CALC-MCNC: 57 MG/DL (CALC)
MCH RBC QN AUTO: 31.2 PG (ref 27–33)
MCHC RBC AUTO-ENTMCNC: 33.2 G/DL (ref 32–36)
MCV RBC AUTO: 94.1 FL (ref 80–100)
NONHDLC SERPL-MCNC: 72 MG/DL (CALC)
PLATELET # BLD AUTO: 472 THOUSAND/UL (ref 140–400)
PMV BLD REES-ECKER: 9.6 FL (ref 7.5–12.5)
POTASSIUM SERPL-SCNC: 4.5 MMOL/L (ref 3.5–5.3)
PROT SERPL-MCNC: 6.8 G/DL (ref 6.1–8.1)
RBC # BLD AUTO: 4.23 MILLION/UL (ref 3.8–5.1)
SODIUM SERPL-SCNC: 136 MMOL/L (ref 135–146)
TRIGL SERPL-MCNC: 66 MG/DL
TSH SERPL-ACNC: 2.62 MIU/L (ref 0.4–4.5)
WBC # BLD AUTO: 6.5 THOUSAND/UL (ref 3.8–10.8)

## 2025-07-23 PROCEDURE — 97110 THERAPEUTIC EXERCISES: CPT | Mod: GP | Performed by: PHYSICAL THERAPIST

## 2025-07-23 PROCEDURE — 97161 PT EVAL LOW COMPLEX 20 MIN: CPT | Mod: GP | Performed by: PHYSICAL THERAPIST

## 2025-07-23 ASSESSMENT — ENCOUNTER SYMPTOMS
DEPRESSION: 0
LOSS OF SENSATION IN FEET: 0
OCCASIONAL FEELINGS OF UNSTEADINESS: 0

## 2025-07-23 ASSESSMENT — PAIN - FUNCTIONAL ASSESSMENT: PAIN_FUNCTIONAL_ASSESSMENT: 0-10

## 2025-07-24 ENCOUNTER — APPOINTMENT (OUTPATIENT)
Dept: GASTROENTEROLOGY | Facility: CLINIC | Age: 75
End: 2025-07-24
Payer: MEDICARE

## 2025-07-28 ENCOUNTER — APPOINTMENT (OUTPATIENT)
Dept: NEUROLOGY | Facility: CLINIC | Age: 75
End: 2025-07-28
Payer: MEDICARE

## 2025-07-29 ENCOUNTER — TREATMENT (OUTPATIENT)
Dept: PHYSICAL THERAPY | Facility: CLINIC | Age: 75
End: 2025-07-29
Payer: MEDICARE

## 2025-07-29 DIAGNOSIS — M25.672 ANKLE JOINT STIFFNESS, BILATERAL: ICD-10-CM

## 2025-07-29 DIAGNOSIS — R29.3 POOR POSTURE: ICD-10-CM

## 2025-07-29 DIAGNOSIS — M25.671 ANKLE JOINT STIFFNESS, BILATERAL: ICD-10-CM

## 2025-07-29 DIAGNOSIS — R29.898 WEAKNESS OF BOTH HIPS: ICD-10-CM

## 2025-07-29 DIAGNOSIS — R26.89 IMPAIRED GAIT AND MOBILITY: ICD-10-CM

## 2025-07-29 PROCEDURE — 97110 THERAPEUTIC EXERCISES: CPT | Mod: GP | Performed by: PHYSICAL THERAPIST

## 2025-07-29 NOTE — PROGRESS NOTES
Physical Therapy    Physical Treatment      Patient Name: Ev Loja  MRN: [id number]  Today's Date:  7/29/2025     Time Entry:   Time Calculation  Start Time: 1120  Stop Time: 1150  Time Calculation (min): 30 min     PT Therapeutic Procedures Time Entry  Therapeutic Exercise Time Entry: 25                 Visit number:  2   Insurance:  Medicare  POC:  7/23/2025-10/21/2025  Primary diagnosis:  Impaired gait and mobility    Assessment:  Pt transferring from [facility] to focus on possible cervicogenic cause of dizziness; pt reports symptoms are intermittent in nature. No symptoms since initial evaluation  Added postural work as well as thoracic extension to HEP       Plan:  Sit to stand  Bridges  Lateral hip strength       Current Problem:   Problem List Items Addressed This Visit    None  Visit Diagnoses         Codes      Impaired gait and mobility     R26.89      Weakness of both hips     R29.898      Ankle joint stiffness, bilateral     M25.671, M25.672      Poor posture     R29.3           Subjective    Pt reports initially saw [facility] for vestibular PT, was determined vestibular symptoms could be cervicogenic  Pt has been  working on exercises from evaluating PT and is feeling good overall  No symptoms since evaluation     From Evaluation:   General:    PER VICKI MADRID NOTE DATED 6/23/2025:  Ev Loja is a 75 y.o. year old female presenting for worsening symptoms .   Referred by: PCP: Sasha Ferguson MD     11/2024- Consult for abnormal MRI from Dr Ferguson  MRI 10/1/24 (for hearing loss)- showed patchy subcortical microvascular changes and volume loss.  No GE changes or enhancement.    Vascular risk factors- RENE, HTN- well controlled at office visits on no medications.   No HPL LDL in 60s, No DM A1c 5.1%, nonsmoker.   Reviewed strategies to  promote brain health.      06/23/25- Re-consult for dizziness, imbalance since April.  PCP dx serous effusions bilaterally and referred back to Neurology.   Ongoing  "symptoms difficult to describe. Associated with neck stiffness along sides and into trapezius, has this on awakening thinks it may be a sleeping pattern with CPAP. Can last for a minute or up to hours, \"just feel off... like I have to be careful\".  No palpitations.  Not spinning, not positional and movements will help.          Objective   CERVICAL ROM:    AROM   EXTENSION 40   FLEXION 50   RIGHT ROTATION 60   LEFT ROTATION 55   RIGHT LATERAL FLEXION 15   LEFT LATERAL FLEXION 20     CERVICAL SPECIAL TEST:  TRANSVERSE LIGAMENT  NEG   SHARP-REGINE NEG    VERTEBRAL ARTERY SUBJECTIVE REPORTING FOR THE FOLLOWING:     NEGATIVE POSITIVE   DYPLOPIA X    DYSARTHRIA X    DYSPHAGIA X    NYSTAGMUS X    DROP ATTACK X    NAUSEA X    FACIAL NUMBNESS X    DIZZINESS X      LOWER EXTREMITY ROM:  Ankle DF to 5 degrees    LOWER EXTREMITY STRENGTH:  HIP STRENGTH      RIGHT   LEFT   EXTENSION 4-/5 4-/5   FLEXION 4-/5 4-/5   ABDUCTION 4-/5 4-/5   ADDUCTION 4/5 4/5     KNEE STRENGTH      RIGHT   LEFT   EXTENSION     5/5         5/5    FLEXION     5/5     5/5      ANKLE STRENGTH      RIGHT   LEFT   DORSIFLEXION 4+/5 4+/5   PLANTAR FLEXION 4+/5 4+/5   INVERSION 4+/5 4+/5   EVERSION 4+/5 4+/5       OCULAR MOTOR EXAM:   NORMAL ABNORMAL   RANGE OF MOTION X    SMOOTH PURSUIT  X    HORIZONTAL SACCADES X    VERTICAL SACCADES X    EYE ALIGNMENT X    COVER X    UNCOVER X    CROSS COVER X    CONVERGENCE X      STATIC BALANCE:  HALF TANDEM:   EYES OPEN EYES CLOSED   SOLID 30 30   FOAM 30 30       TREATMENT  Therapeutic exercise:   Lateral cervical lateral flexion 3 times 30\"   Levator scap 3 times 30\"   Cervical chin tucks x 10 with 3 hold   Cervical ext over towel x 10   Cervical rot with towel x 10 ea direction   Wall wash x 10   Thoracic mobilization against towel x 10  Hip 4 planes green 10 times 1 set         Goals:  Active       PT Problem       PT Goal 1: Patient will increase ankle DF to 10 degrees to improve gait mechanics and increase ease " in ankle strategies.         Start:  07/23/25    Expected End:  10/21/25            PT Goal 2:  Patient will increase hip Abduction strength to 4/5 to allow increased ease in community ambulation and stair negotiation.        Start:  07/23/25    Expected End:  10/21/25            PT Goal 3:  Patient will increase cervical rotation to 0-60 degrees to allow the patient to scan environment without pain.        Start:  07/23/25    Expected End:  10/21/25            PT Goal 4: Patient decrease neck pain to 0-2/10 to allow increased ease in scanning the environment.         Start:  07/23/25    Expected End:  10/21/25            PT Goal 5:  Patient will be independent in Home Exercise Program to manage symptoms and maximize their functional independence.        Start:  07/23/25    Expected End:  10/21/25            Patient Stated Goal 1:  Feel more balanced as I walk.       Start:  07/23/25    Expected End:  10/21/25

## 2025-08-08 ENCOUNTER — TREATMENT (OUTPATIENT)
Dept: PHYSICAL THERAPY | Facility: CLINIC | Age: 75
End: 2025-08-08
Payer: MEDICARE

## 2025-08-08 DIAGNOSIS — M25.671 ANKLE JOINT STIFFNESS, BILATERAL: ICD-10-CM

## 2025-08-08 DIAGNOSIS — R26.89 IMPAIRED GAIT AND MOBILITY: ICD-10-CM

## 2025-08-08 DIAGNOSIS — R29.898 WEAKNESS OF BOTH HIPS: ICD-10-CM

## 2025-08-08 DIAGNOSIS — M25.672 ANKLE JOINT STIFFNESS, BILATERAL: ICD-10-CM

## 2025-08-08 DIAGNOSIS — R29.3 POOR POSTURE: ICD-10-CM

## 2025-08-08 PROCEDURE — 97110 THERAPEUTIC EXERCISES: CPT | Mod: GP | Performed by: PHYSICAL THERAPIST

## 2025-08-08 NOTE — PROGRESS NOTES
Physical Therapy    Physical Treatment      Patient Name: Ev Loja  MRN: [id number]  Today's Date:  8/8/2025     Time Entry:   Time Calculation  Start Time: 0915  Stop Time: 0946  Time Calculation (min): 31 min     PT Therapeutic Procedures Time Entry  Therapeutic Exercise Time Entry: 25                 Visit number:  3   Insurance:  Medicare  POC:  7/23/2025-10/21/2025  Primary diagnosis:  Impaired gait and mobility    Assessment:   Pt did well with additional postural strengthening work. Mild L shoulder discomfort with bilat ER,  improved with reduced ROM  Will continue to progress as tolerated      Plan:  Sit to stand  Postural holds with weight   Farmer's carry   Manual?      Current Problem:   Problem List Items Addressed This Visit    None  Visit Diagnoses         Codes      Impaired gait and mobility     R26.89      Weakness of both hips     R29.898      Ankle joint stiffness, bilateral     M25.671, M25.672      Poor posture     R29.3           Subjective    Pt reports feeling okay, no significant changes since last session       Objective     LOWER EXTREMITY STRENGTH:  HIP STRENGTH      RIGHT   LEFT   EXTENSION 4-/5 4-/5   FLEXION 4-/5 4-/5   ABDUCTION 4-/5 4-/5   ADDUCTION 4/5 4/5     KNEE STRENGTH      RIGHT   LEFT   EXTENSION     5/5         5/5    FLEXION     5/5     5/5      ANKLE STRENGTH      RIGHT   LEFT   DORSIFLEXION 4+/5 4+/5   PLANTAR FLEXION 4+/5 4+/5   INVERSION 4+/5 4+/5   EVERSION 4+/5 4+/5         TREATMENT  Therapeutic exercise:   Hip abd/ER (blue TB) x 20   Resisted bridges (blue TB) 2 x 10   Cervical chin tucks x 10 with 3 hold   Cervical flexion x 10 with 3 hold   Cervical ext over towel x 10   Cervical rot with towel x 10 ea direction   Wall wash x 10   Thoracic mobilization against towel x 10  Bilat shoulder ER (red TB) 2 x 10   Bilat shoulder horizontal abd (red TB) 2 x 10   Hip 4 way (green TB) x 10 ea- not today         Goals:  Active       PT Problem       PT Goal 1: Patient will  increase ankle DF to 10 degrees to improve gait mechanics and increase ease in ankle strategies.         Start:  07/23/25    Expected End:  10/21/25            PT Goal 2:  Patient will increase hip Abduction strength to 4/5 to allow increased ease in community ambulation and stair negotiation.        Start:  07/23/25    Expected End:  10/21/25            PT Goal 3:  Patient will increase cervical rotation to 0-60 degrees to allow the patient to scan environment without pain.        Start:  07/23/25    Expected End:  10/21/25            PT Goal 4: Patient decrease neck pain to 0-2/10 to allow increased ease in scanning the environment.         Start:  07/23/25    Expected End:  10/21/25            PT Goal 5:  Patient will be independent in Home Exercise Program to manage symptoms and maximize their functional independence.        Start:  07/23/25    Expected End:  10/21/25            Patient Stated Goal 1:  Feel more balanced as I walk.       Start:  07/23/25    Expected End:  10/21/25

## 2025-08-14 ENCOUNTER — TREATMENT (OUTPATIENT)
Dept: PHYSICAL THERAPY | Facility: CLINIC | Age: 75
End: 2025-08-14
Payer: MEDICARE

## 2025-08-14 DIAGNOSIS — M25.671 ANKLE JOINT STIFFNESS, BILATERAL: ICD-10-CM

## 2025-08-14 DIAGNOSIS — R29.898 WEAKNESS OF BOTH HIPS: ICD-10-CM

## 2025-08-14 DIAGNOSIS — R26.89 IMPAIRED GAIT AND MOBILITY: ICD-10-CM

## 2025-08-14 DIAGNOSIS — M25.672 ANKLE JOINT STIFFNESS, BILATERAL: ICD-10-CM

## 2025-08-14 DIAGNOSIS — R29.3 POOR POSTURE: ICD-10-CM

## 2025-08-14 PROCEDURE — 97110 THERAPEUTIC EXERCISES: CPT | Mod: GP | Performed by: PHYSICAL THERAPIST

## 2025-08-19 ENCOUNTER — HOSPITAL ENCOUNTER (OUTPATIENT)
Dept: RADIOLOGY | Facility: CLINIC | Age: 75
Discharge: HOME | End: 2025-08-19
Payer: MEDICARE

## 2025-08-19 VITALS — HEIGHT: 65 IN | BODY MASS INDEX: 16.5 KG/M2 | WEIGHT: 99 LBS

## 2025-08-19 DIAGNOSIS — Z12.31 BREAST CANCER SCREENING BY MAMMOGRAM: ICD-10-CM

## 2025-08-19 PROCEDURE — 77063 BREAST TOMOSYNTHESIS BI: CPT | Performed by: GENERAL PRACTICE

## 2025-08-19 PROCEDURE — 77067 SCR MAMMO BI INCL CAD: CPT

## 2025-08-19 PROCEDURE — 77067 SCR MAMMO BI INCL CAD: CPT | Performed by: GENERAL PRACTICE

## 2025-08-21 ENCOUNTER — TREATMENT (OUTPATIENT)
Dept: PHYSICAL THERAPY | Facility: CLINIC | Age: 75
End: 2025-08-21
Payer: MEDICARE

## 2025-08-21 DIAGNOSIS — R26.89 IMPAIRED GAIT AND MOBILITY: ICD-10-CM

## 2025-08-21 DIAGNOSIS — M25.672 ANKLE JOINT STIFFNESS, BILATERAL: ICD-10-CM

## 2025-08-21 DIAGNOSIS — R29.898 WEAKNESS OF BOTH HIPS: ICD-10-CM

## 2025-08-21 DIAGNOSIS — M25.671 ANKLE JOINT STIFFNESS, BILATERAL: ICD-10-CM

## 2025-08-21 DIAGNOSIS — R29.3 POOR POSTURE: ICD-10-CM

## 2025-08-21 PROCEDURE — 97110 THERAPEUTIC EXERCISES: CPT | Mod: GP | Performed by: PHYSICAL THERAPIST

## 2025-09-23 ENCOUNTER — APPOINTMENT (OUTPATIENT)
Dept: DERMATOLOGY | Facility: CLINIC | Age: 75
End: 2025-09-23
Payer: MEDICARE

## 2025-09-29 ENCOUNTER — APPOINTMENT (OUTPATIENT)
Dept: SLEEP MEDICINE | Facility: CLINIC | Age: 75
End: 2025-09-29
Payer: MEDICARE

## 2025-10-23 ENCOUNTER — APPOINTMENT (OUTPATIENT)
Dept: GASTROENTEROLOGY | Facility: CLINIC | Age: 75
End: 2025-10-23
Payer: MEDICARE

## 2025-11-24 ENCOUNTER — APPOINTMENT (OUTPATIENT)
Dept: SLEEP MEDICINE | Facility: CLINIC | Age: 75
End: 2025-11-24
Payer: MEDICARE